# Patient Record
Sex: MALE | Race: WHITE | NOT HISPANIC OR LATINO
[De-identification: names, ages, dates, MRNs, and addresses within clinical notes are randomized per-mention and may not be internally consistent; named-entity substitution may affect disease eponyms.]

---

## 2017-01-03 ENCOUNTER — APPOINTMENT (OUTPATIENT)
Dept: UROLOGY | Facility: CLINIC | Age: 67
End: 2017-01-03

## 2017-01-19 ENCOUNTER — LABORATORY RESULT (OUTPATIENT)
Age: 67
End: 2017-01-19

## 2017-01-19 ENCOUNTER — APPOINTMENT (OUTPATIENT)
Dept: UROLOGY | Facility: CLINIC | Age: 67
End: 2017-01-19

## 2017-02-10 ENCOUNTER — APPOINTMENT (OUTPATIENT)
Dept: UROLOGY | Facility: CLINIC | Age: 67
End: 2017-02-10

## 2017-02-23 ENCOUNTER — APPOINTMENT (OUTPATIENT)
Dept: UROLOGY | Facility: CLINIC | Age: 67
End: 2017-02-23

## 2017-11-07 ENCOUNTER — APPOINTMENT (OUTPATIENT)
Dept: UROLOGY | Facility: CLINIC | Age: 67
End: 2017-11-07

## 2017-11-09 ENCOUNTER — APPOINTMENT (OUTPATIENT)
Dept: UROLOGY | Facility: CLINIC | Age: 67
End: 2017-11-09

## 2017-11-21 ENCOUNTER — OUTPATIENT (OUTPATIENT)
Dept: OUTPATIENT SERVICES | Facility: HOSPITAL | Age: 67
LOS: 1 days | End: 2017-11-21
Payer: MEDICARE

## 2017-11-21 ENCOUNTER — APPOINTMENT (OUTPATIENT)
Dept: UROLOGY | Facility: CLINIC | Age: 67
End: 2017-11-21
Payer: MEDICARE

## 2017-11-21 VITALS
RESPIRATION RATE: 16 BRPM | DIASTOLIC BLOOD PRESSURE: 74 MMHG | TEMPERATURE: 97.6 F | SYSTOLIC BLOOD PRESSURE: 140 MMHG | HEART RATE: 67 BPM

## 2017-11-21 DIAGNOSIS — R35.0 FREQUENCY OF MICTURITION: ICD-10-CM

## 2017-11-21 DIAGNOSIS — Z98.89 OTHER SPECIFIED POSTPROCEDURAL STATES: Chronic | ICD-10-CM

## 2017-11-21 DIAGNOSIS — Z98.49 CATARACT EXTRACTION STATUS, UNSPECIFIED EYE: Chronic | ICD-10-CM

## 2017-11-21 PROCEDURE — 52000 CYSTOURETHROSCOPY: CPT

## 2017-11-27 DIAGNOSIS — Z85.51 PERSONAL HISTORY OF MALIGNANT NEOPLASM OF BLADDER: ICD-10-CM

## 2017-11-27 LAB — CORE LAB FLUID CYTOLOGY: NORMAL

## 2017-12-06 ENCOUNTER — FORM ENCOUNTER (OUTPATIENT)
Age: 67
End: 2017-12-06

## 2017-12-07 ENCOUNTER — APPOINTMENT (OUTPATIENT)
Dept: CT IMAGING | Facility: IMAGING CENTER | Age: 67
End: 2017-12-07
Payer: MEDICARE

## 2017-12-07 ENCOUNTER — OUTPATIENT (OUTPATIENT)
Dept: OUTPATIENT SERVICES | Facility: HOSPITAL | Age: 67
LOS: 1 days | End: 2017-12-07
Payer: MEDICARE

## 2017-12-07 DIAGNOSIS — Z98.49 CATARACT EXTRACTION STATUS, UNSPECIFIED EYE: Chronic | ICD-10-CM

## 2017-12-07 DIAGNOSIS — Z98.89 OTHER SPECIFIED POSTPROCEDURAL STATES: Chronic | ICD-10-CM

## 2017-12-07 DIAGNOSIS — C67.9 MALIGNANT NEOPLASM OF BLADDER, UNSPECIFIED: ICD-10-CM

## 2017-12-07 PROCEDURE — 82565 ASSAY OF CREATININE: CPT

## 2017-12-07 PROCEDURE — 74178 CT ABD&PLV WO CNTR FLWD CNTR: CPT | Mod: 26

## 2017-12-07 PROCEDURE — 74178 CT ABD&PLV WO CNTR FLWD CNTR: CPT

## 2018-01-01 ENCOUNTER — OUTPATIENT (OUTPATIENT)
Dept: OUTPATIENT SERVICES | Facility: HOSPITAL | Age: 68
LOS: 1 days | End: 2018-01-01
Payer: MEDICAID

## 2018-01-01 DIAGNOSIS — Z98.89 OTHER SPECIFIED POSTPROCEDURAL STATES: Chronic | ICD-10-CM

## 2018-01-01 DIAGNOSIS — Z98.49 CATARACT EXTRACTION STATUS, UNSPECIFIED EYE: Chronic | ICD-10-CM

## 2018-01-01 PROCEDURE — G9001: CPT

## 2018-01-10 ENCOUNTER — OUTPATIENT (OUTPATIENT)
Dept: OUTPATIENT SERVICES | Facility: HOSPITAL | Age: 68
LOS: 1 days | End: 2018-01-10
Payer: MEDICARE

## 2018-01-10 VITALS
RESPIRATION RATE: 16 BRPM | WEIGHT: 223.11 LBS | DIASTOLIC BLOOD PRESSURE: 70 MMHG | TEMPERATURE: 98 F | HEIGHT: 65 IN | SYSTOLIC BLOOD PRESSURE: 140 MMHG | HEART RATE: 75 BPM

## 2018-01-10 DIAGNOSIS — I25.10 ATHEROSCLEROTIC HEART DISEASE OF NATIVE CORONARY ARTERY WITHOUT ANGINA PECTORIS: ICD-10-CM

## 2018-01-10 DIAGNOSIS — E11.9 TYPE 2 DIABETES MELLITUS WITHOUT COMPLICATIONS: ICD-10-CM

## 2018-01-10 DIAGNOSIS — Z98.89 OTHER SPECIFIED POSTPROCEDURAL STATES: Chronic | ICD-10-CM

## 2018-01-10 DIAGNOSIS — I10 ESSENTIAL (PRIMARY) HYPERTENSION: ICD-10-CM

## 2018-01-10 DIAGNOSIS — C67.9 MALIGNANT NEOPLASM OF BLADDER, UNSPECIFIED: ICD-10-CM

## 2018-01-10 DIAGNOSIS — Z86.73 PERSONAL HISTORY OF TRANSIENT ISCHEMIC ATTACK (TIA), AND CEREBRAL INFARCTION WITHOUT RESIDUAL DEFICITS: ICD-10-CM

## 2018-01-10 DIAGNOSIS — Z98.49 CATARACT EXTRACTION STATUS, UNSPECIFIED EYE: Chronic | ICD-10-CM

## 2018-01-10 LAB
APPEARANCE UR: SIGNIFICANT CHANGE UP
BILIRUB UR-MCNC: NEGATIVE — SIGNIFICANT CHANGE UP
BLOOD UR QL VISUAL: HIGH
BUN SERPL-MCNC: 20 MG/DL — SIGNIFICANT CHANGE UP (ref 7–23)
CALCIUM SERPL-MCNC: 9.1 MG/DL — SIGNIFICANT CHANGE UP (ref 8.4–10.5)
CHLORIDE SERPL-SCNC: 102 MMOL/L — SIGNIFICANT CHANGE UP (ref 98–107)
CO2 SERPL-SCNC: 26 MMOL/L — SIGNIFICANT CHANGE UP (ref 22–31)
COLOR SPEC: HIGH
CREAT SERPL-MCNC: 0.98 MG/DL — SIGNIFICANT CHANGE UP (ref 0.5–1.3)
GLUCOSE SERPL-MCNC: 99 MG/DL — SIGNIFICANT CHANGE UP (ref 70–99)
GLUCOSE UR-MCNC: SIGNIFICANT CHANGE UP
HBA1C BLD-MCNC: 6.4 % — HIGH (ref 4–5.6)
HCT VFR BLD CALC: 44.6 % — SIGNIFICANT CHANGE UP (ref 39–50)
HGB BLD-MCNC: 15.2 G/DL — SIGNIFICANT CHANGE UP (ref 13–17)
KETONES UR-MCNC: NEGATIVE — SIGNIFICANT CHANGE UP
LEUKOCYTE ESTERASE UR-ACNC: HIGH
MCHC RBC-ENTMCNC: 30.7 PG — SIGNIFICANT CHANGE UP (ref 27–34)
MCHC RBC-ENTMCNC: 34.1 % — SIGNIFICANT CHANGE UP (ref 32–36)
MCV RBC AUTO: 90.1 FL — SIGNIFICANT CHANGE UP (ref 80–100)
NITRITE UR-MCNC: NEGATIVE — SIGNIFICANT CHANGE UP
NRBC # FLD: 0 — SIGNIFICANT CHANGE UP
PH UR: 6.5 — SIGNIFICANT CHANGE UP (ref 4.6–8)
PLATELET # BLD AUTO: 158 K/UL — SIGNIFICANT CHANGE UP (ref 150–400)
PMV BLD: 12.2 FL — SIGNIFICANT CHANGE UP (ref 7–13)
POTASSIUM SERPL-MCNC: 3.3 MMOL/L — LOW (ref 3.5–5.3)
POTASSIUM SERPL-SCNC: 3.3 MMOL/L — LOW (ref 3.5–5.3)
PROT UR-MCNC: 100 MG/DL — SIGNIFICANT CHANGE UP
RBC # BLD: 4.95 M/UL — SIGNIFICANT CHANGE UP (ref 4.2–5.8)
RBC # FLD: 14.3 % — SIGNIFICANT CHANGE UP (ref 10.3–14.5)
RBC CASTS # UR COMP ASSIST: >50 — HIGH (ref 0–?)
SODIUM SERPL-SCNC: 144 MMOL/L — SIGNIFICANT CHANGE UP (ref 135–145)
SP GR SPEC: 1.02 — SIGNIFICANT CHANGE UP (ref 1–1.04)
UROBILINOGEN FLD QL: NORMAL MG/DL — SIGNIFICANT CHANGE UP
WBC # BLD: 5.85 K/UL — SIGNIFICANT CHANGE UP (ref 3.8–10.5)
WBC # FLD AUTO: 5.85 K/UL — SIGNIFICANT CHANGE UP (ref 3.8–10.5)
WBC UR QL: >50 — HIGH (ref 0–?)

## 2018-01-10 PROCEDURE — 93010 ELECTROCARDIOGRAM REPORT: CPT

## 2018-01-10 RX ORDER — SODIUM CHLORIDE 9 MG/ML
1000 INJECTION, SOLUTION INTRAVENOUS
Qty: 0 | Refills: 0 | Status: DISCONTINUED | OUTPATIENT
Start: 2018-01-17 | End: 2018-01-17

## 2018-01-10 NOTE — H&P PST ADULT - GENITOURINARY COMMENTS
reports not able to urinate today , unable to collect urine cx, pt stated that it was done few weeks ago by PCP refer to hpi

## 2018-01-10 NOTE — H&P PST ADULT - PMH
BPH (benign prostatic hyperplasia)    Coronary artery disease    History of MI (myocardial infarction)  on Plavix  History of stroke  2010, no residual  Hyperlipidemia    Hypertension    Malignant neoplasm of bladder  multiple bladder tumors  Type 2 diabetes mellitus    UTI (urinary tract infection)  treated in May 2016 BPH (benign prostatic hyperplasia)    Coronary artery disease    History of MI (myocardial infarction)  on Plavix  History of stroke  2010, no residual  Hyperlipidemia    Hypertension    Malignant neoplasm of bladder  multiple bladder tumors  Obesity    Type 2 diabetes mellitus    UTI (urinary tract infection)  treated in May 2016

## 2018-01-10 NOTE — H&P PST ADULT - NSANTHOSAYNRD_GEN_A_CORE
No. SORAIDA screening performed.  STOP BANG Legend: 0-2 = LOW Risk; 3-4 = INTERMEDIATE Risk; 5-8 = HIGH Risk

## 2018-01-10 NOTE — H&P PST ADULT - PROBLEM SELECTOR PLAN 4
gross hematuria.   per pt plavix and low dose aspirin d/c 2 months ago  pt is poor historian.  Requested medical eval, appointment scheduled for 1/11/18 @ 12noon  Pending copy of report & comparison EKG and most recent cardiology studies

## 2018-01-10 NOTE — H&P PST ADULT - ENDOCRINE COMMENTS
Denies diabetic compilations type II DM type II DM fasting home FS monitoring done, pt states  "I don't remember any numbers"

## 2018-01-10 NOTE — H&P PST ADULT - PRIMARY CARE PROVIDER
Dr. Teran [phone], fax [phone] - spoke with Chelsea to forward recent labs Dr. Teran [phone], fax [phone]

## 2018-01-10 NOTE — H&P PST ADULT - NEGATIVE CARDIOVASCULAR SYMPTOMS
no chest pain/no paroxysmal nocturnal dyspnea/no orthopnea/no dyspnea on exertion/no claudication/no palpitations/no peripheral edema

## 2018-01-10 NOTE — H&P PST ADULT - PROBLEM SELECTOR PLAN 3
pt instructed to hold evening dose metformin on 1/16/18 pt instructed to hold evening dose metformin on 1/16/18  fs on admit

## 2018-01-10 NOTE — H&P PST ADULT - LYMPHATIC
posterior cervical R/posterior cervical L/anterior cervical L/supraclavicular L/anterior cervical R/supraclavicular R

## 2018-01-10 NOTE — H&P PST ADULT - HISTORY OF PRESENT ILLNESS
65 yr old Swedish speaking male with HTN, DM2, HLD, CAD, h/o MI, CVA ( 2010) on Plavix only, BPH, malignant neoplasm of the urinary bladder s/p TURBT 5/24/16 at [facility], presents to PST for scheduled repeat TURBT on 8/3/16. Communicated with patient in native language. Patient is poor historian, easily irritated and impatient. Refused to do lab work today, reports recent labs by  PCP. Completed cardiac evaluation by Dr. Graham prior initial TURBT in May, I will obtain records. 67 yr old Colombian speaking male with HTN, DM2, HLD, CAD, h/o MI, CVA ( 2010) on Plavix only, BPH, malignant neoplasm of the urinary bladder s/p TURBT 5/24/16 at [facility], presents to PST for scheduled cystoscopy TURBT on 1/17/2018.  Patient is poor historian, easily irritated and impatient. Refused to do lab work today, reports recent labs by  PCP. Completed cardiac evaluation by Dr. Graham prior initial TURBT in May, I will obtain records. 67 yr old Barbadian speaking male with HTN, DM2, HLD, CAD, h/o MI, CVA ( 2010) on Plavix only, BPH, malignant neoplasm of the urinary bladder s/p TURBT 5/24/16 at [facility], presents to PST for scheduled cystoscopy TURBT on 1/17/2018.  Patient is poor historian, easily irritated and impatient. 67 yr old Uruguayan speaking male with HTN, DM2, HLD, CAD, h/o MI, CVA ( 2010) on Plavix & low dose aspirin - per pt d/c 2 months ago due to gross hematuria. BPH, malignant neoplasm of the urinary bladder s/p TURBT 5/24/16 at [facility], presents to PST for scheduled cystoscopy TURBT on 1/17/2018.  Patient is poor historian, easily irritated and impatient.

## 2018-01-10 NOTE — H&P PST ADULT - PSH
History of bladder surgery  TURBT 5/24/16  S/P cataract surgery  bilateral  S/P inguinal hernia repair  bilateral

## 2018-01-10 NOTE — H&P PST ADULT - NEGATIVE GASTROINTESTINAL SYMPTOMS
no change in bowel habits/no abdominal pain no nausea/no vomiting/no constipation/no change in bowel habits/no diarrhea

## 2018-01-10 NOTE — H&P PST ADULT - ACTIVITY
able to walk a block or two, climb 1 flight of stairs, house work, shopping, can take care of self adls, house chores

## 2018-01-10 NOTE — H&P PST ADULT - PROBLEM SELECTOR PLAN 1
scheduled for cystoscopy, TURB tumor on 1/17/018  preop instructions gi prophylaxis given  pt verbalized understanding of all PSt instructions

## 2018-01-12 LAB
BACTERIA UR CULT: SIGNIFICANT CHANGE UP
SPECIMEN SOURCE: SIGNIFICANT CHANGE UP

## 2018-01-16 NOTE — ASU PATIENT PROFILE, ADULT - PMH
BPH (benign prostatic hyperplasia)    Coronary artery disease    History of MI (myocardial infarction)  on Plavix  History of stroke  2010, no residual  Hyperlipidemia    Hypertension    Malignant neoplasm of bladder  multiple bladder tumors  Obesity    Type 2 diabetes mellitus    UTI (urinary tract infection)  treated in May 2016

## 2018-01-17 ENCOUNTER — RESULT REVIEW (OUTPATIENT)
Age: 68
End: 2018-01-17

## 2018-01-17 ENCOUNTER — APPOINTMENT (OUTPATIENT)
Dept: UROLOGY | Facility: HOSPITAL | Age: 68
End: 2018-01-17

## 2018-01-17 ENCOUNTER — TRANSCRIPTION ENCOUNTER (OUTPATIENT)
Age: 68
End: 2018-01-17

## 2018-01-17 ENCOUNTER — INPATIENT (INPATIENT)
Facility: HOSPITAL | Age: 68
LOS: 1 days | Discharge: HOME | End: 2018-01-19
Attending: UROLOGY | Admitting: UROLOGY
Payer: MEDICARE

## 2018-01-17 VITALS
OXYGEN SATURATION: 96 % | DIASTOLIC BLOOD PRESSURE: 68 MMHG | HEART RATE: 77 BPM | SYSTOLIC BLOOD PRESSURE: 144 MMHG | HEIGHT: 66 IN | TEMPERATURE: 98 F | RESPIRATION RATE: 18 BRPM | WEIGHT: 220.02 LBS

## 2018-01-17 DIAGNOSIS — C67.9 MALIGNANT NEOPLASM OF BLADDER, UNSPECIFIED: ICD-10-CM

## 2018-01-17 DIAGNOSIS — Z98.49 CATARACT EXTRACTION STATUS, UNSPECIFIED EYE: Chronic | ICD-10-CM

## 2018-01-17 DIAGNOSIS — Z98.89 OTHER SPECIFIED POSTPROCEDURAL STATES: Chronic | ICD-10-CM

## 2018-01-17 LAB
GLUCOSE BLDC GLUCOMTR-MCNC: 137 MG/DL — HIGH (ref 70–99)
GLUCOSE BLDC GLUCOMTR-MCNC: 141 MG/DL — HIGH (ref 70–99)
POTASSIUM BLDV-SCNC: 3.9 MMOL/L — SIGNIFICANT CHANGE UP (ref 3.4–4.5)

## 2018-01-17 PROCEDURE — 52240 CYSTOSCOPY AND TREATMENT: CPT

## 2018-01-17 PROCEDURE — 88307 TISSUE EXAM BY PATHOLOGIST: CPT | Mod: 26

## 2018-01-17 RX ORDER — FENTANYL CITRATE 50 UG/ML
25 INJECTION INTRAVENOUS
Qty: 0 | Refills: 0 | Status: DISCONTINUED | OUTPATIENT
Start: 2018-01-17 | End: 2018-01-17

## 2018-01-17 RX ORDER — DEXTROSE 50 % IN WATER 50 %
12.5 SYRINGE (ML) INTRAVENOUS ONCE
Qty: 0 | Refills: 0 | Status: DISCONTINUED | OUTPATIENT
Start: 2018-01-17 | End: 2018-01-19

## 2018-01-17 RX ORDER — SODIUM CHLORIDE 9 MG/ML
1000 INJECTION, SOLUTION INTRAVENOUS
Qty: 0 | Refills: 0 | Status: DISCONTINUED | OUTPATIENT
Start: 2018-01-17 | End: 2018-01-19

## 2018-01-17 RX ORDER — CEPHALEXIN 500 MG
1 CAPSULE ORAL
Qty: 9 | Refills: 0 | OUTPATIENT
Start: 2018-01-17 | End: 2018-01-19

## 2018-01-17 RX ORDER — ONDANSETRON 8 MG/1
4 TABLET, FILM COATED ORAL ONCE
Qty: 0 | Refills: 0 | Status: DISCONTINUED | OUTPATIENT
Start: 2018-01-17 | End: 2018-01-17

## 2018-01-17 RX ORDER — DEXTROSE 50 % IN WATER 50 %
25 SYRINGE (ML) INTRAVENOUS ONCE
Qty: 0 | Refills: 0 | Status: DISCONTINUED | OUTPATIENT
Start: 2018-01-17 | End: 2018-01-19

## 2018-01-17 RX ORDER — CEFAZOLIN SODIUM 1 G
2000 VIAL (EA) INJECTION EVERY 8 HOURS
Qty: 0 | Refills: 0 | Status: COMPLETED | OUTPATIENT
Start: 2018-01-17 | End: 2018-01-18

## 2018-01-17 RX ORDER — SODIUM CHLORIDE 9 MG/ML
1000 INJECTION, SOLUTION INTRAVENOUS
Qty: 0 | Refills: 0 | Status: DISCONTINUED | OUTPATIENT
Start: 2018-01-17 | End: 2018-01-18

## 2018-01-17 RX ORDER — INSULIN LISPRO 100/ML
VIAL (ML) SUBCUTANEOUS AT BEDTIME
Qty: 0 | Refills: 0 | Status: DISCONTINUED | OUTPATIENT
Start: 2018-01-17 | End: 2018-01-19

## 2018-01-17 RX ORDER — MEMANTINE HYDROCHLORIDE 10 MG/1
10 TABLET ORAL
Qty: 0 | Refills: 0 | Status: DISCONTINUED | OUTPATIENT
Start: 2018-01-17 | End: 2018-01-19

## 2018-01-17 RX ORDER — TAMSULOSIN HYDROCHLORIDE 0.4 MG/1
0.4 CAPSULE ORAL
Qty: 0 | Refills: 0 | Status: DISCONTINUED | OUTPATIENT
Start: 2018-01-17 | End: 2018-01-19

## 2018-01-17 RX ORDER — ACETAMINOPHEN 500 MG
650 TABLET ORAL EVERY 6 HOURS
Qty: 0 | Refills: 0 | Status: DISCONTINUED | OUTPATIENT
Start: 2018-01-17 | End: 2018-01-19

## 2018-01-17 RX ORDER — LIDOCAINE 4 G/100G
1 CREAM TOPICAL
Qty: 0 | Refills: 0 | COMMUNITY
Start: 2018-01-17

## 2018-01-17 RX ORDER — DEXTROSE 50 % IN WATER 50 %
1 SYRINGE (ML) INTRAVENOUS ONCE
Qty: 0 | Refills: 0 | Status: DISCONTINUED | OUTPATIENT
Start: 2018-01-17 | End: 2018-01-19

## 2018-01-17 RX ORDER — AMLODIPINE BESYLATE 2.5 MG/1
10 TABLET ORAL DAILY
Qty: 0 | Refills: 0 | Status: DISCONTINUED | OUTPATIENT
Start: 2018-01-17 | End: 2018-01-19

## 2018-01-17 RX ORDER — METOPROLOL TARTRATE 50 MG
25 TABLET ORAL
Qty: 0 | Refills: 0 | Status: DISCONTINUED | OUTPATIENT
Start: 2018-01-17 | End: 2018-01-19

## 2018-01-17 RX ORDER — INSULIN LISPRO 100/ML
VIAL (ML) SUBCUTANEOUS
Qty: 0 | Refills: 0 | Status: DISCONTINUED | OUTPATIENT
Start: 2018-01-17 | End: 2018-01-19

## 2018-01-17 RX ORDER — ATORVASTATIN CALCIUM 80 MG/1
20 TABLET, FILM COATED ORAL ONCE
Qty: 0 | Refills: 0 | Status: DISCONTINUED | OUTPATIENT
Start: 2018-01-17 | End: 2018-01-17

## 2018-01-17 RX ORDER — GLUCAGON INJECTION, SOLUTION 0.5 MG/.1ML
1 INJECTION, SOLUTION SUBCUTANEOUS ONCE
Qty: 0 | Refills: 0 | Status: DISCONTINUED | OUTPATIENT
Start: 2018-01-17 | End: 2018-01-19

## 2018-01-17 RX ORDER — FENTANYL CITRATE 50 UG/ML
50 INJECTION INTRAVENOUS
Qty: 0 | Refills: 0 | Status: DISCONTINUED | OUTPATIENT
Start: 2018-01-17 | End: 2018-01-17

## 2018-01-17 RX ORDER — SENNA PLUS 8.6 MG/1
1 TABLET ORAL AT BEDTIME
Qty: 0 | Refills: 0 | Status: DISCONTINUED | OUTPATIENT
Start: 2018-01-17 | End: 2018-01-19

## 2018-01-17 RX ORDER — HYDROCHLOROTHIAZIDE 25 MG
25 TABLET ORAL ONCE
Qty: 0 | Refills: 0 | Status: COMPLETED | OUTPATIENT
Start: 2018-01-17 | End: 2018-01-17

## 2018-01-17 RX ORDER — LIDOCAINE 4 G/100G
1 CREAM TOPICAL
Qty: 0 | Refills: 0 | Status: DISCONTINUED | OUTPATIENT
Start: 2018-01-17 | End: 2018-01-19

## 2018-01-17 RX ORDER — ATORVASTATIN CALCIUM 80 MG/1
80 TABLET, FILM COATED ORAL AT BEDTIME
Qty: 0 | Refills: 0 | Status: DISCONTINUED | OUTPATIENT
Start: 2018-01-17 | End: 2018-01-19

## 2018-01-17 RX ORDER — HEPARIN SODIUM 5000 [USP'U]/ML
5000 INJECTION INTRAVENOUS; SUBCUTANEOUS EVERY 8 HOURS
Qty: 0 | Refills: 0 | Status: DISCONTINUED | OUTPATIENT
Start: 2018-01-17 | End: 2018-01-19

## 2018-01-17 RX ORDER — ASPIRIN/CALCIUM CARB/MAGNESIUM 324 MG
325 TABLET ORAL ONCE
Qty: 0 | Refills: 0 | Status: COMPLETED | OUTPATIENT
Start: 2018-01-17 | End: 2018-01-17

## 2018-01-17 RX ORDER — CLOPIDOGREL BISULFATE 75 MG/1
1 TABLET, FILM COATED ORAL
Qty: 0 | Refills: 0 | COMMUNITY

## 2018-01-17 RX ORDER — DOCUSATE SODIUM 100 MG
100 CAPSULE ORAL THREE TIMES A DAY
Qty: 0 | Refills: 0 | Status: DISCONTINUED | OUTPATIENT
Start: 2018-01-17 | End: 2018-01-19

## 2018-01-17 RX ORDER — LOSARTAN POTASSIUM 100 MG/1
100 TABLET, FILM COATED ORAL DAILY
Qty: 0 | Refills: 0 | Status: DISCONTINUED | OUTPATIENT
Start: 2018-01-17 | End: 2018-01-19

## 2018-01-17 RX ADMIN — Medication 325 MILLIGRAM(S): at 09:41

## 2018-01-17 RX ADMIN — MEMANTINE HYDROCHLORIDE 10 MILLIGRAM(S): 10 TABLET ORAL at 21:49

## 2018-01-17 RX ADMIN — FENTANYL CITRATE 50 MICROGRAM(S): 50 INJECTION INTRAVENOUS at 13:25

## 2018-01-17 RX ADMIN — LIDOCAINE 1 APPLICATION(S): 4 CREAM TOPICAL at 13:45

## 2018-01-17 RX ADMIN — FENTANYL CITRATE 25 MICROGRAM(S): 50 INJECTION INTRAVENOUS at 13:16

## 2018-01-17 RX ADMIN — Medication 25 MILLIGRAM(S): at 21:49

## 2018-01-17 RX ADMIN — FENTANYL CITRATE 25 MICROGRAM(S): 50 INJECTION INTRAVENOUS at 12:45

## 2018-01-17 RX ADMIN — Medication 100 MILLIGRAM(S): at 21:49

## 2018-01-17 RX ADMIN — FENTANYL CITRATE 25 MICROGRAM(S): 50 INJECTION INTRAVENOUS at 12:26

## 2018-01-17 RX ADMIN — SODIUM CHLORIDE 30 MILLILITER(S): 9 INJECTION, SOLUTION INTRAVENOUS at 09:41

## 2018-01-17 RX ADMIN — FENTANYL CITRATE 50 MICROGRAM(S): 50 INJECTION INTRAVENOUS at 13:47

## 2018-01-17 RX ADMIN — TAMSULOSIN HYDROCHLORIDE 0.4 MILLIGRAM(S): 0.4 CAPSULE ORAL at 19:26

## 2018-01-17 RX ADMIN — FENTANYL CITRATE 25 MICROGRAM(S): 50 INJECTION INTRAVENOUS at 12:41

## 2018-01-17 RX ADMIN — SENNA PLUS 1 TABLET(S): 8.6 TABLET ORAL at 21:49

## 2018-01-17 NOTE — PROGRESS NOTE ADULT - PROBLEM SELECTOR PLAN 1
Strict I&O's / CBI / irrigate PRN  Analgesia Antiemetics  DVT prophylaxis  Incentive spirometry  Clears to Reg DM / OOB  AM labs  Wean CBI  in AM   D/C planning in AM

## 2018-01-17 NOTE — ASU DISCHARGE PLAN (ADULT/PEDIATRIC). - MEDICATION SUMMARY - MEDICATIONS TO STOP TAKING
I will STOP taking the medications listed below when I get home from the hospital:    Plavix 75 mg oral tablet  -- 1 tab(s) by mouth once a day, am, last dose 2 months ago

## 2018-01-17 NOTE — ASU DISCHARGE PLAN (ADULT/PEDIATRIC). - SPECIAL INSTRUCTIONS
You may take over the counter tylenol and/or ibuprofen as directed for pain.     Drink plenty of fluids. You may notice blood in your urine for several days, which is normal.    Take 3 days of antibiotics to prevent urinary tract infection    Dr. Yun would like to see you next Thursday for catheter removal. Please call the office to schedule/confirm an appointment.    [facility]  [address] [place] [address] [phone]

## 2018-01-17 NOTE — PROGRESS NOTE ADULT - SUBJECTIVE AND OBJECTIVE BOX
Note    Post op Check    s/p : TURBT    Pt seen / examined without complaints pain controlled on CBI    Vital Signs Last 24 Hrs  T(C): 36.4 (17 Jan 2018 15:00), Max: 36.6 (17 Jan 2018 12:10)  T(F): 97.5 (17 Jan 2018 15:00), Max: 97.9 (17 Jan 2018 12:10)  HR: 70 (17 Jan 2018 17:00) (55 - 77)  BP: 141/53 (17 Jan 2018 17:00) (129/61 - 164/83)  BP(mean): --  RR: 16 (17 Jan 2018 17:00) (10 - 22)  SpO2: 97% (17 Jan 2018 17:00) (93% - 100%)    I&O's Summary    17 Jan 2018 07:01  -  17 Jan 2018 18:01  --------------------------------------------------------  IN: 690 mL / OUT: 0 mL / NET: 690 mL    CBI light punch    PHYSICAL EXAM:       Constitutional: awake alert NAD    Respiratory: no resp distress    Cardiovascular: RRR    Gastrointestinal: soft    Genitourinary: varela in place CBI in progress light punch    Extremities: AROM x 4

## 2018-01-17 NOTE — ASU DISCHARGE PLAN (ADULT/PEDIATRIC). - MEDICATION SUMMARY - MEDICATIONS TO TAKE
I will START or STAY ON the medications listed below when I get home from the hospital:    vitamin D  -- 39309 unit(s) by mouth once a week  -- Indication: For home med    aspirin 81 mg oral tablet  -- 1 tab(s) by mouth once a dayam - last dose 2 months ago  -- Indication: For home med    cloNIDine 0.1 mg oral tablet  -- orally once a day,am  -- Indication: For home med    Flomax 0.4 mg oral capsule  -- orally 2 times a day  -- Indication: For home med    metFORMIN 500 mg oral tablet  -- orally once a day  -- Indication: For home med    Crestor 20 mg oral tablet  -- 1 tab(s) by mouth once a day (at bedtime)  -- Indication: For home med    Tribenzor 40 mg-10 mg-25 mg oral tablet  -- 1 tab(s) by mouth once a day  -- Indication: For home med    metoprolol tartrate 25 mg oral tablet  -- 1 tab(s) by mouth 2 times a day  -- Indication: For home med    Keflex 500 mg oral capsule  -- 1 cap(s) by mouth every 8 hours   -- Finish all this medication unless otherwise directed by prescriber.    -- Indication: For UTI prevention    lidocaine 2% topical gel with applicator  -- 1 application on skin 5 times a day, As needed, catheter pain  -- Indication: For catheter/penile tip pain    Namenda 10 mg oral tablet  -- 1 tab(s) by mouth 2 times a day  -- Indication: For home med

## 2018-01-17 NOTE — ASU DISCHARGE PLAN (ADULT/PEDIATRIC). - NOTIFY
Bleeding that does not stop/Persistent Nausea and Vomiting/Unable to Urinate/Fever greater than 101/Pain not relieved by Medications

## 2018-01-17 NOTE — BRIEF OPERATIVE NOTE - PROCEDURE
<<-----Click on this checkbox to enter Procedure JEMAL, using monopolar cautery  01/17/2018    Active  JEFF

## 2018-01-18 ENCOUNTER — TRANSCRIPTION ENCOUNTER (OUTPATIENT)
Age: 68
End: 2018-01-18

## 2018-01-18 DIAGNOSIS — I25.10 ATHEROSCLEROTIC HEART DISEASE OF NATIVE CORONARY ARTERY WITHOUT ANGINA PECTORIS: ICD-10-CM

## 2018-01-18 DIAGNOSIS — I10 ESSENTIAL (PRIMARY) HYPERTENSION: ICD-10-CM

## 2018-01-18 DIAGNOSIS — D62 ACUTE POSTHEMORRHAGIC ANEMIA: ICD-10-CM

## 2018-01-18 DIAGNOSIS — Z86.73 PERSONAL HISTORY OF TRANSIENT ISCHEMIC ATTACK (TIA), AND CEREBRAL INFARCTION WITHOUT RESIDUAL DEFICITS: ICD-10-CM

## 2018-01-18 DIAGNOSIS — E78.5 HYPERLIPIDEMIA, UNSPECIFIED: ICD-10-CM

## 2018-01-18 DIAGNOSIS — N40.0 BENIGN PROSTATIC HYPERPLASIA WITHOUT LOWER URINARY TRACT SYMPTOMS: ICD-10-CM

## 2018-01-18 DIAGNOSIS — E11.9 TYPE 2 DIABETES MELLITUS WITHOUT COMPLICATIONS: ICD-10-CM

## 2018-01-18 DIAGNOSIS — Z29.9 ENCOUNTER FOR PROPHYLACTIC MEASURES, UNSPECIFIED: ICD-10-CM

## 2018-01-18 LAB
BUN SERPL-MCNC: 15 MG/DL — SIGNIFICANT CHANGE UP (ref 7–23)
CALCIUM SERPL-MCNC: 8 MG/DL — LOW (ref 8.4–10.5)
CHLORIDE SERPL-SCNC: 102 MMOL/L — SIGNIFICANT CHANGE UP (ref 98–107)
CO2 SERPL-SCNC: 26 MMOL/L — SIGNIFICANT CHANGE UP (ref 22–31)
CREAT SERPL-MCNC: 1.01 MG/DL — SIGNIFICANT CHANGE UP (ref 0.5–1.3)
GLUCOSE BLDC GLUCOMTR-MCNC: 122 MG/DL — HIGH (ref 70–99)
GLUCOSE BLDC GLUCOMTR-MCNC: 130 MG/DL — HIGH (ref 70–99)
GLUCOSE BLDC GLUCOMTR-MCNC: 152 MG/DL — HIGH (ref 70–99)
GLUCOSE BLDC GLUCOMTR-MCNC: 184 MG/DL — HIGH (ref 70–99)
GLUCOSE SERPL-MCNC: 167 MG/DL — HIGH (ref 70–99)
HCT VFR BLD CALC: 35.9 % — LOW (ref 39–50)
HGB BLD-MCNC: 11.5 G/DL — LOW (ref 13–17)
MCHC RBC-ENTMCNC: 29.2 PG — SIGNIFICANT CHANGE UP (ref 27–34)
MCHC RBC-ENTMCNC: 32 % — SIGNIFICANT CHANGE UP (ref 32–36)
MCV RBC AUTO: 91.1 FL — SIGNIFICANT CHANGE UP (ref 80–100)
NRBC # FLD: 0 — SIGNIFICANT CHANGE UP
PLATELET # BLD AUTO: 142 K/UL — LOW (ref 150–400)
PMV BLD: 12.2 FL — SIGNIFICANT CHANGE UP (ref 7–13)
POTASSIUM SERPL-MCNC: 4.1 MMOL/L — SIGNIFICANT CHANGE UP (ref 3.5–5.3)
POTASSIUM SERPL-SCNC: 4.1 MMOL/L — SIGNIFICANT CHANGE UP (ref 3.5–5.3)
RBC # BLD: 3.94 M/UL — LOW (ref 4.2–5.8)
RBC # FLD: 14 % — SIGNIFICANT CHANGE UP (ref 10.3–14.5)
SODIUM SERPL-SCNC: 139 MMOL/L — SIGNIFICANT CHANGE UP (ref 135–145)
WBC # BLD: 5.92 K/UL — SIGNIFICANT CHANGE UP (ref 3.8–10.5)
WBC # FLD AUTO: 5.92 K/UL — SIGNIFICANT CHANGE UP (ref 3.8–10.5)

## 2018-01-18 PROCEDURE — 99223 1ST HOSP IP/OBS HIGH 75: CPT

## 2018-01-18 RX ORDER — OXYBUTYNIN CHLORIDE 5 MG
5 TABLET ORAL EVERY 8 HOURS
Qty: 0 | Refills: 0 | Status: DISCONTINUED | OUTPATIENT
Start: 2018-01-18 | End: 2018-01-19

## 2018-01-18 RX ORDER — SODIUM CHLORIDE 9 MG/ML
1000 INJECTION, SOLUTION INTRAVENOUS
Qty: 0 | Refills: 0 | Status: DISCONTINUED | OUTPATIENT
Start: 2018-01-18 | End: 2018-01-18

## 2018-01-18 RX ADMIN — Medication 100 MILLIGRAM(S): at 13:56

## 2018-01-18 RX ADMIN — SENNA PLUS 1 TABLET(S): 8.6 TABLET ORAL at 22:25

## 2018-01-18 RX ADMIN — MEMANTINE HYDROCHLORIDE 10 MILLIGRAM(S): 10 TABLET ORAL at 05:45

## 2018-01-18 RX ADMIN — Medication 2: at 17:43

## 2018-01-18 RX ADMIN — TAMSULOSIN HYDROCHLORIDE 0.4 MILLIGRAM(S): 0.4 CAPSULE ORAL at 17:44

## 2018-01-18 RX ADMIN — Medication 100 MILLIGRAM(S): at 05:45

## 2018-01-18 RX ADMIN — SODIUM CHLORIDE 75 MILLILITER(S): 9 INJECTION, SOLUTION INTRAVENOUS at 13:55

## 2018-01-18 RX ADMIN — Medication 100 MILLIGRAM(S): at 22:25

## 2018-01-18 RX ADMIN — TAMSULOSIN HYDROCHLORIDE 0.4 MILLIGRAM(S): 0.4 CAPSULE ORAL at 05:45

## 2018-01-18 RX ADMIN — Medication 25 MILLIGRAM(S): at 17:44

## 2018-01-18 RX ADMIN — HEPARIN SODIUM 5000 UNIT(S): 5000 INJECTION INTRAVENOUS; SUBCUTANEOUS at 13:56

## 2018-01-18 RX ADMIN — Medication 5 MILLIGRAM(S): at 22:25

## 2018-01-18 RX ADMIN — Medication 2: at 08:57

## 2018-01-18 RX ADMIN — ATORVASTATIN CALCIUM 80 MILLIGRAM(S): 80 TABLET, FILM COATED ORAL at 02:05

## 2018-01-18 RX ADMIN — HEPARIN SODIUM 5000 UNIT(S): 5000 INJECTION INTRAVENOUS; SUBCUTANEOUS at 22:25

## 2018-01-18 RX ADMIN — ATORVASTATIN CALCIUM 80 MILLIGRAM(S): 80 TABLET, FILM COATED ORAL at 22:25

## 2018-01-18 RX ADMIN — Medication 5 MILLIGRAM(S): at 13:56

## 2018-01-18 RX ADMIN — MEMANTINE HYDROCHLORIDE 10 MILLIGRAM(S): 10 TABLET ORAL at 17:44

## 2018-01-18 NOTE — CONSULT NOTE ADULT - PROBLEM SELECTOR RECOMMENDATION 9
-s/p cysto/TURBT of large bladder tumor on 1/17  -postop management per , IVF, awaiting return of bowel function, pain control, bowel regimen, incentive spirometry and early mobilization  -d/c plan per primary team  -h/o UTI, on cefazolin prophylaxis  -wean CBI for gross hematuria per primary team heparin sc for DVT prophylaxis  pt with hypercoagulable state due to malignancy

## 2018-01-18 NOTE — DISCHARGE NOTE ADULT - CARE PLAN
Principal Discharge DX:	Malignant neoplasm of bladder  Goal:	Surgical Recovery  Assessment and plan of treatment:	s/p TURBT  Resolution of Hematuria

## 2018-01-18 NOTE — DISCHARGE NOTE ADULT - HOSPITAL COURSE
Admitted for Transurethral Resection of Bladder Tumor on 1/17/18.  Pt was placed on CBI post operatively and slowly weaned until POD#1 where is was discontinued.  Pt's urine color was acceptable, was tolerating diet and pain was controlled.  Pt will be d/c'ed with varela and f/u in 1 week for a TOV in Dr. Yun's office

## 2018-01-18 NOTE — DISCHARGE NOTE ADULT - INSTRUCTIONS
Slowly resume previous diet.  Initially avoid fatty, greasy foods and large meals.  Maintain Diabetic Diet  Increase fluid intake. Slowly resume previous diet.  Initially avoid fatty, greasy foods and large meals.  Maintain Diabetic Diet  Increase fluid intake. Kuo care as instructed; get iron tabs, folic acid and stool softener at your pharmacy; take for 2 weeks to help blood count; make appt to see your doctor Tuesday drink plenty of fluids.call md for any fever above 100.2,bloody urine, and worsening pain

## 2018-01-18 NOTE — CONSULT NOTE ADULT - ASSESSMENT
67 yr old Palestinian speaking male with h/o HTN, DM-2, dyslipidemia, CAD s/p MI (no stent), CVA ( 2010) without residual deficit on ASA (plavix discontinued 2 months ago due to gross hematuria), BPH, BPH, bladder cancer s/p TURBT 5/24/16 at [facility],s/p cystoscopy and TURBT of large bladder tumor on 1/17/2018, postop gross hematuria on CBI

## 2018-01-18 NOTE — PROGRESS NOTE ADULT - PROBLEM SELECTOR PLAN 1
Continue CBI on full  Manual irrigation prn  Continue Ancef  AM labs reviewed  NPO for now, will d/w Dr. Yun further plan  DVT prophy, IS  OOB to chair

## 2018-01-18 NOTE — DISCHARGE NOTE ADULT - MEDICATION SUMMARY - MEDICATIONS TO STOP TAKING
I will STOP taking the medications listed below when I get home from the hospital:    aspirin 81 mg oral tablet  -- 1 tab(s) by mouth once a dayam - last dose 2 months ago    Plavix 75 mg oral tablet  -- 1 tab(s) by mouth once a day, am, last dose 2 months ago

## 2018-01-18 NOTE — CONSULT NOTE ADULT - PROBLEM SELECTOR RECOMMENDATION 2
-c/w cardiac meds (lopressor 25 mg bid, losartan 100 mg qd, statin)  -resume ASA 81 mg when cleared by  -h/o CAD and MI, no stent  -c/w cardiac meds (lopressor 25 mg bid, losartan 100 mg qd, statin)  -resume ASA 81 mg when cleared by   Preop cardiac workup  EKG (1/10/18): NSR, first degree AV block, RBBB, HR 75, TWI III and aVF  Echo (6/20/17): normal LVEF 55%, mild diastolic dysfxn, mild aortic sclerosis  Nuclear stress test (4/21/16): no myocardial ischemia

## 2018-01-18 NOTE — DISCHARGE NOTE ADULT - PATIENT PORTAL LINK FT
“You can access the [facility] Patient Portal, offered by [facility], by registering with the following website: [email or web address]”

## 2018-01-18 NOTE — PROGRESS NOTE ADULT - SUBJECTIVE AND OBJECTIVE BOX
Overnight events:  None, remained on CBI    Subjective:  Pt offers no complaints, no N/V, abdominal pain, not OOB yet    Objective:    Vital signs  T(C): , Max: 37.1 (01-18-18 @ 02:14)  HR: 67 (01-18-18 @ 05:37)  BP: 107/57 (01-18-18 @ 05:37)  SpO2: 99% (01-18-18 @ 05:37)  Wt(kg): --    Output   CBI: bloody    Gen: NAD  Abd: soft, nontender, no suprapubic tenderness or fullness  : Kuo secured, flushed, no clots    Labs                        11.5   5.92  )-----------( 142      ( 18 Jan 2018 06:30 )             35.9     18 Jan 2018 06:30    139    |  102    |  15     ----------------------------<  167    4.1     |  26     |  1.01     Ca    8.0        18 Jan 2018 06:30

## 2018-01-18 NOTE — DISCHARGE NOTE ADULT - CONDITIONS AT DISCHARGE
alert and awake. abdomen soft. Kuo with light blood tinged output. no clots noted .fo césar connected to leg bag. ambulation tolerated well.

## 2018-01-18 NOTE — DISCHARGE NOTE ADULT - CARE PROVIDERS DIRECT ADDRESSES
[email or web address]

## 2018-01-18 NOTE — CONSULT NOTE ADULT - SUBJECTIVE AND OBJECTIVE BOX
HPI:  67 yr old Mexican speaking male with h/o HTN, DM-2, dyslipidemia, CAD s/p MI (no stent), CVA ( 2010) without residual deficit on ASA (plavix discontinued 2 months ago due to gross hematuria), BPH, BPH, bladder cancer s/p TURBT 5/24/16 at [facility], admitted for cystoscopy and TURBT of large bladder tumor on 1/17/2018. Patient seen on POD1, denies h/o cardiac stent/chest pain/sob, c/o gross hematuria postop and has concerns about going home today with varela.     PAST MEDICAL & SURGICAL HISTORY:  Obesity  UTI (urinary tract infection): treated in May 2016  Hyperlipidemia  History of stroke: 2010, no residual  History of MI (myocardial infarction): on Plavix  Coronary artery disease  Malignant neoplasm of bladder: multiple bladder tumors  BPH (benign prostatic hyperplasia)  Hypertension  Type 2 diabetes mellitus  History of bladder surgery: TURBT 5/24/16 &amp; 8/2016  S/P cataract surgery: bilateral  S/P inguinal hernia repair: bilateral      Review of Systems:   CONSTITUTIONAL: No fever, weight loss, or fatigue  EYES: No eye pain, visual disturbances, or discharge  ENMT:  No difficulty hearing, tinnitus, vertigo; No sinus or throat pain  NECK: No pain or stiffness  BREASTS: No pain, masses, or nipple discharge  RESPIRATORY: No cough, wheezing, chills or hemoptysis; No shortness of breath  CARDIOVASCULAR: No chest pain, palpitations, dizziness, or leg swelling  GASTROINTESTINAL: No abdominal or epigastric pain. No nausea, vomiting, or hematemesis; No diarrhea or constipation. No melena or hematochezia.  GENITOURINARY: No dysuria, frequency, +gross hematuria  NEUROLOGICAL: No headaches, memory loss, loss of strength, numbness, or tremors  SKIN: No itching, burning, rashes, or lesions   LYMPH NODES: No enlarged glands  ENDOCRINE: No heat or cold intolerance; No hair loss  MUSCULOSKELETAL: No joint pain or swelling; No muscle, back, or extremity pain  PSYCHIATRIC: No depression, anxiety, mood swings, or difficulty sleeping  HEME/LYMPH: No easy bruising, or bleeding gums  ALLERY AND IMMUNOLOGIC: No hives or eczema    Allergies    No Known Allergies    Intolerances    Social History: drinks 1-2 drinks wine/liquor monthly, former smoker 1/2 ppd x35 years, quit in 1982    FAMILY HISTORY:  Family history of coronary artery disease      Home Medications:  aspirin 81 mg oral tablet: 1 tab(s) orally once a dayam - last dose 2 months ago (17 Jan 2018 09:27)  cloNIDine 0.1 mg oral tablet: orally once a day,am (17 Jan 2018 09:27)  Crestor 20 mg oral tablet: 1 tab(s) orally once a day (at bedtime) (17 Jan 2018 09:27)  Flomax 0.4 mg oral capsule: orally 2 times a day (17 Jan 2018 09:27)  lidocaine 2% topical gel with applicator: 1 application topically 5 times a day, As needed, catheter pain (17 Jan 2018 17:54)  metFORMIN 500 mg oral tablet: orally once a day (17 Jan 2018 09:27)  metoprolol tartrate 25 mg oral tablet: 1 tab(s) orally 2 times a day (17 Jan 2018 09:27)  Namenda 10 mg oral tablet: 1 tab(s) orally 2 times a day (17 Jan 2018 09:27)  Tribenzor 40 mg-10 mg-25 mg oral tablet: 1 tab(s) orally once a day (17 Jan 2018 09:27)  vitamin D: 02538 unit(s) orally once a week (17 Jan 2018 09:27)      MEDICATIONS  (STANDING):  amLODIPine   Tablet 10 milliGRAM(s) Oral daily  atorvastatin 80 milliGRAM(s) Oral at bedtime  ceFAZolin   IVPB 2000 milliGRAM(s) IV Intermittent every 8 hours  cloNIDine 0.1 milliGRAM(s) Oral daily  dextrose 5%. 1000 milliLiter(s) (50 mL/Hr) IV Continuous <Continuous>  dextrose 50% Injectable 12.5 Gram(s) IV Push once  dextrose 50% Injectable 25 Gram(s) IV Push once  dextrose 50% Injectable 25 Gram(s) IV Push once  docusate sodium 100 milliGRAM(s) Oral three times a day  heparin  Injectable 5000 Unit(s) SubCutaneous every 8 hours  insulin lispro (HumaLOG) corrective regimen sliding scale   SubCutaneous three times a day before meals  insulin lispro (HumaLOG) corrective regimen sliding scale   SubCutaneous at bedtime  lactated ringers. 1000 milliLiter(s) (75 mL/Hr) IV Continuous <Continuous>  losartan 100 milliGRAM(s) Oral daily  memantine 10 milliGRAM(s) Oral two times a day  metoprolol     tartrate 25 milliGRAM(s) Oral two times a day  oxybutynin 5 milliGRAM(s) Oral every 8 hours  senna 1 Tablet(s) Oral at bedtime  tamsulosin 0.4 milliGRAM(s) Oral two times a day    MEDICATIONS  (PRN):  acetaminophen   Tablet. 650 milliGRAM(s) Oral every 6 hours PRN Mild Pain (1 - 3)  dextrose Gel 1 Dose(s) Oral once PRN Blood Glucose LESS THAN 70 milliGRAM(s)/deciliter  glucagon  Injectable 1 milliGRAM(s) IntraMuscular once PRN Glucose LESS THAN 70 milligrams/deciliter  lidocaine 2% Gel 1 Application(s) Topical five times a day PRN catheter pain      Vital Signs Last 24 Hrs  T(C): 37.2 (18 Jan 2018 09:43), Max: 37.2 (18 Jan 2018 09:43)  T(F): 98.9 (18 Jan 2018 09:43), Max: 98.9 (18 Jan 2018 09:43)  HR: 69 (18 Jan 2018 09:43) (55 - 76)  BP: 112/52 (18 Jan 2018 09:43) (107/57 - 164/83)  BP(mean): --  RR: 17 (18 Jan 2018 09:43) (10 - 22)  SpO2: 97% (18 Jan 2018 09:43) (93% - 100%)  CAPILLARY BLOOD GLUCOSE      POCT Blood Glucose.: 152 mg/dL (18 Jan 2018 08:53)  POCT Blood Glucose.: 141 mg/dL (17 Jan 2018 22:34)    I&O's Summary    17 Jan 2018 07:01  -  18 Jan 2018 07:00  --------------------------------------------------------  IN: 785 mL / OUT: 0 mL / NET: 785 mL    18 Jan 2018 07:01  -  18 Jan 2018 11:00  --------------------------------------------------------  IN: 6000 mL / OUT: 6500 mL / NET: -500 mL        PHYSICAL EXAM:  GENERAL: NAD, well-developed, obese  HEAD:  Atraumatic, Normocephalic  EYES: EOMI, PERRLA, conjunctiva and sclera clear  NECK: Supple, No JVD  CHEST/LUNG: Clear to auscultation bilaterally; No wheeze  HEART: Regular rate and rhythm; No murmurs, rubs, or gallops  ABDOMEN: Soft, Nontender, obese  : varela with gross hematuria, on CBI  EXTREMITIES:  2+ Peripheral Pulses, No clubbing, cyanosis, or edema  PSYCH: AAOx3  NEUROLOGY: non-focal  SKIN: No rashes or lesions    LABS:                        11.5   5.92  )-----------( 142      ( 18 Jan 2018 06:30 )             35.9     01-18    139  |  102  |  15  ----------------------------<  167<H>  4.1   |  26  |  1.01    Ca    8.0<L>      18 Jan 2018 06:30    Preop cardiac workup  EKG (1/10/18): NSR, first degree AV block, RBBB, HR 75, TWI III and aVF  Echo (6/20/17): normal LVEF 55%, mild diastolic dysfxn, mild aortic sclerosis  Nuclear stress test (4/21/16): no myocardial ischemia     Microbiology     RADIOLOGY & ADDITIONAL TESTS:    Imaging Personally Reviewed:  < from: CT Abdomen and Pelvis w/wo IV Cont (12.07.17 @ 13:16) >  KIDNEYS/URETERS: A 7 mm left renal calculus. No urothelial lesion   identified.    BLADDER: Multifocal masslike bladder nodules.  REPRODUCTIVE ORGANS: The prostate is unremarkable.    BOWEL: No bowel obstruction. Appendix is normal.  PERITONEUM: No ascites.  VESSELS:Within normal limits.  RETROPERITONEUM: No lymphadenopathy.    ABDOMINAL WALL: Fat-containing umbilical hernia.  BONES: Small lucent lesion in the left posterior ninth rib.    IMPRESSION:   Multifocal bladder cancer.    Small lucent lesion in the left ninth rib.    Subcentimeter left renal calculus.    Consultant(s) Notes Reviewed:      Care Discussed with Consultants/Other Providers:  CHANDRA Lara, on CBI for hematuria s/p TURBT for large bladder tumor

## 2018-01-18 NOTE — DISCHARGE NOTE ADULT - ADDITIONAL INSTRUCTIONS
Home with varela catheter  Follow up with Dr. Yun next Thursday for removal of varela and trial of void in [facility]. Office  [phone].  Call the office if you have fever greater than 101, difficulty urinating, pain not relieved with pain medication, nausea/vomiting.  Change to small varela bag during day and large at night as instructed.  If any problems with catheter -increased bleeding or not draining call [phone] for assistance Home with varela catheter  Follow up with Dr. Yun next Tuesday for removal of varela and trial of void in [facility]. Office  [phone].  Call the office if you have fever greater than 101, difficulty urinating, pain not relieved with pain medication, nausea/vomiting.  Change to small varela bag during day and large at night as instructed.  If any problems with catheter -increased bleeding or not draining call [phone] for assistance

## 2018-01-19 VITALS
HEART RATE: 78 BPM | SYSTOLIC BLOOD PRESSURE: 115 MMHG | TEMPERATURE: 99 F | DIASTOLIC BLOOD PRESSURE: 52 MMHG | OXYGEN SATURATION: 98 % | RESPIRATION RATE: 16 BRPM

## 2018-01-19 LAB
BLD GP AB SCN SERPL QL: NEGATIVE — SIGNIFICANT CHANGE UP
BUN SERPL-MCNC: 16 MG/DL — SIGNIFICANT CHANGE UP (ref 7–23)
CALCIUM SERPL-MCNC: 8.1 MG/DL — LOW (ref 8.4–10.5)
CHLORIDE SERPL-SCNC: 105 MMOL/L — SIGNIFICANT CHANGE UP (ref 98–107)
CO2 SERPL-SCNC: 27 MMOL/L — SIGNIFICANT CHANGE UP (ref 22–31)
CREAT SERPL-MCNC: 0.97 MG/DL — SIGNIFICANT CHANGE UP (ref 0.5–1.3)
GLUCOSE BLDC GLUCOMTR-MCNC: 205 MG/DL — HIGH (ref 70–99)
GLUCOSE SERPL-MCNC: 161 MG/DL — HIGH (ref 70–99)
HCT VFR BLD CALC: 29 % — LOW (ref 39–50)
HGB BLD-MCNC: 9.3 G/DL — LOW (ref 13–17)
MCHC RBC-ENTMCNC: 29 PG — SIGNIFICANT CHANGE UP (ref 27–34)
MCHC RBC-ENTMCNC: 32.1 % — SIGNIFICANT CHANGE UP (ref 32–36)
MCV RBC AUTO: 90.3 FL — SIGNIFICANT CHANGE UP (ref 80–100)
NRBC # FLD: 0 — SIGNIFICANT CHANGE UP
PLATELET # BLD AUTO: 131 K/UL — LOW (ref 150–400)
PMV BLD: 11.8 FL — SIGNIFICANT CHANGE UP (ref 7–13)
POTASSIUM SERPL-MCNC: 4.1 MMOL/L — SIGNIFICANT CHANGE UP (ref 3.5–5.3)
POTASSIUM SERPL-SCNC: 4.1 MMOL/L — SIGNIFICANT CHANGE UP (ref 3.5–5.3)
RBC # BLD: 3.21 M/UL — LOW (ref 4.2–5.8)
RBC # FLD: 14.1 % — SIGNIFICANT CHANGE UP (ref 10.3–14.5)
RH IG SCN BLD-IMP: POSITIVE — SIGNIFICANT CHANGE UP
SODIUM SERPL-SCNC: 141 MMOL/L — SIGNIFICANT CHANGE UP (ref 135–145)
SURGICAL PATHOLOGY STUDY: SIGNIFICANT CHANGE UP
WBC # BLD: 6.7 K/UL — SIGNIFICANT CHANGE UP (ref 3.8–10.5)
WBC # FLD AUTO: 6.7 K/UL — SIGNIFICANT CHANGE UP (ref 3.8–10.5)

## 2018-01-19 PROCEDURE — 99233 SBSQ HOSP IP/OBS HIGH 50: CPT

## 2018-01-19 RX ORDER — ASCORBIC ACID 60 MG
500 TABLET,CHEWABLE ORAL DAILY
Qty: 0 | Refills: 0 | Status: DISCONTINUED | OUTPATIENT
Start: 2018-01-19 | End: 2018-01-19

## 2018-01-19 RX ORDER — SODIUM CHLORIDE 9 MG/ML
1000 INJECTION INTRAMUSCULAR; INTRAVENOUS; SUBCUTANEOUS ONCE
Qty: 0 | Refills: 0 | Status: COMPLETED | OUTPATIENT
Start: 2018-01-19 | End: 2018-01-19

## 2018-01-19 RX ORDER — FERROUS SULFATE 325(65) MG
325 TABLET ORAL DAILY
Qty: 0 | Refills: 0 | Status: DISCONTINUED | OUTPATIENT
Start: 2018-01-19 | End: 2018-01-19

## 2018-01-19 RX ORDER — FOLIC ACID 0.8 MG
1 TABLET ORAL DAILY
Qty: 0 | Refills: 0 | Status: DISCONTINUED | OUTPATIENT
Start: 2018-01-19 | End: 2018-01-19

## 2018-01-19 RX ADMIN — Medication 5 MILLIGRAM(S): at 05:53

## 2018-01-19 RX ADMIN — TAMSULOSIN HYDROCHLORIDE 0.4 MILLIGRAM(S): 0.4 CAPSULE ORAL at 05:57

## 2018-01-19 RX ADMIN — SODIUM CHLORIDE 1000 MILLILITER(S): 9 INJECTION INTRAMUSCULAR; INTRAVENOUS; SUBCUTANEOUS at 08:27

## 2018-01-19 RX ADMIN — MEMANTINE HYDROCHLORIDE 10 MILLIGRAM(S): 10 TABLET ORAL at 05:53

## 2018-01-19 RX ADMIN — Medication 25 MILLIGRAM(S): at 05:53

## 2018-01-19 RX ADMIN — HEPARIN SODIUM 5000 UNIT(S): 5000 INJECTION INTRAVENOUS; SUBCUTANEOUS at 05:53

## 2018-01-19 RX ADMIN — Medication 4: at 09:04

## 2018-01-19 RX ADMIN — Medication 100 MILLIGRAM(S): at 05:53

## 2018-01-19 NOTE — PROGRESS NOTE ADULT - ASSESSMENT
67 yr old South Sudanese speaking male with h/o HTN, DM-2, dyslipidemia, CAD s/p MI (no stent), CVA ( 2010) without residual deficit on ASA (plavix discontinued 2 months ago due to gross hematuria), BPH, BPH, bladder cancer s/p TURBT 5/24/16 at [facility],s/p cystoscopy and TURBT of large bladder tumor on 1/17/2018, postop gross hematuria s/p CBI

## 2018-01-19 NOTE — PROGRESS NOTE ADULT - SUBJECTIVE AND OBJECTIVE BOX
POD #2    Afeb 105/62  69  96%RA    Pt has no c/o    Abd- soft NT ND     Kuo/ CBI slow drip pink; was off earlier and got punch-colored

## 2018-01-19 NOTE — PROGRESS NOTE ADULT - PROBLEM SELECTOR PLAN 2
-h/o CAD and MI, no stent  -c/w cardiac meds (lopressor 25 mg bid, losartan 100 mg qd, statin)  -resume ASA 81 mg when cleared by   Preop cardiac workup  EKG (1/10/18): NSR, first degree AV block, RBBB, HR 75, TWI III and aVF  Echo (6/20/17): normal LVEF 55%, mild diastolic dysfxn, mild aortic sclerosis  Nuclear stress test (4/21/16): no myocardial ischemia.

## 2018-01-19 NOTE — PROGRESS NOTE ADULT - PROBLEM SELECTOR PLAN 1
-s/p cysto/TURBT of large bladder tumor on 1/17  -progressing postop, diet advanced to regular, pain control, bowel regimen, incentive spirometry and early mobilization  -off CBI, d/c plan home today per primary team

## 2018-01-19 NOTE — PROGRESS NOTE ADULT - SUBJECTIVE AND OBJECTIVE BOX
CHIEF COMPLAINT: Patient is a 67y old  male who presents with a chief complaint of admitted for treatment of bladder tumor via TURBT (18 Jan 2018 11:26)      SUBJECTIVE / OVERNIGHT EVENTS:  pt feels ok, denies chest pain or sob, going home today    MEDICATIONS  (STANDING):  amLODIPine   Tablet 10 milliGRAM(s) Oral daily  ascorbic acid 500 milliGRAM(s) Oral daily  atorvastatin 80 milliGRAM(s) Oral at bedtime  cloNIDine 0.1 milliGRAM(s) Oral daily  dextrose 5%. 1000 milliLiter(s) (50 mL/Hr) IV Continuous <Continuous>  dextrose 50% Injectable 12.5 Gram(s) IV Push once  dextrose 50% Injectable 25 Gram(s) IV Push once  dextrose 50% Injectable 25 Gram(s) IV Push once  docusate sodium 100 milliGRAM(s) Oral three times a day  ferrous    sulfate 325 milliGRAM(s) Oral daily  folic acid 1 milliGRAM(s) Oral daily  heparin  Injectable 5000 Unit(s) SubCutaneous every 8 hours  insulin lispro (HumaLOG) corrective regimen sliding scale   SubCutaneous three times a day before meals  insulin lispro (HumaLOG) corrective regimen sliding scale   SubCutaneous at bedtime  losartan 100 milliGRAM(s) Oral daily  memantine 10 milliGRAM(s) Oral two times a day  metoprolol     tartrate 25 milliGRAM(s) Oral two times a day  oxybutynin 5 milliGRAM(s) Oral every 8 hours  senna 1 Tablet(s) Oral at bedtime  tamsulosin 0.4 milliGRAM(s) Oral two times a day    MEDICATIONS  (PRN):  acetaminophen   Tablet. 650 milliGRAM(s) Oral every 6 hours PRN Mild Pain (1 - 3)  dextrose Gel 1 Dose(s) Oral once PRN Blood Glucose LESS THAN 70 milliGRAM(s)/deciliter  glucagon  Injectable 1 milliGRAM(s) IntraMuscular once PRN Glucose LESS THAN 70 milligrams/deciliter  lidocaine 2% Gel 1 Application(s) Topical five times a day PRN catheter pain      VITALS:  T(F): 98.9 (01-19-18 @ 10:05), Max: 99.3 (01-18-18 @ 22:01)  HR: 78 (01-19-18 @ 10:05) (63 - 78)  BP: 115/52 (01-19-18 @ 10:05) (101/66 - 118/58)  RR: 16 (01-19-18 @ 10:05) (16 - 18)  SpO2: 98% (01-19-18 @ 10:05)      CAPILLARY BLOOD GLUCOSE    Output     I&O's Summary  T(F): 98.9 (01-19-18 @ 10:05), Max: 99.3 (01-18-18 @ 22:01)  HR: 78 (01-19-18 @ 10:05) (63 - 78)  BP: 115/52 (01-19-18 @ 10:05) (101/66 - 118/58)  RR: 16 (01-19-18 @ 10:05) (16 - 18)  SpO2: 98% (01-19-18 @ 10:05)    PHYSICAL EXAM:  GENERAL: NAD, well-developed, obese  HEAD:  Atraumatic, Normocephalic  EYES: EOMI, PERRLA, conjunctiva and sclera clear  NECK: Supple, No JVD  CHEST/LUNG: Clear to auscultation bilaterally; No wheeze  HEART: Regular rate and rhythm; No murmurs, rubs, or gallops  ABDOMEN: Soft, Nontender, nondistended,  obese  : avery with hematuria, now off CBI  EXTREMITIES:  2+ Peripheral Pulses, No clubbing, cyanosis, or edema  PSYCH: AAOx3  NEUROLOGY: non-focal  SKIN: No rashes or lesions      LABS:              9.3                  141  | 27   | 16           6.70  >-----------< 131     ------------------------< 161                   29.0                 4.1  | 105  | 0.97                                         Ca 8.1   Mg x     Ph x        MICROBIOLOGY:    RADIOLOGY & ADDITIONAL TESTS:    Imaging Personally Reviewed:    [ ] Consultant(s) Notes Reviewed:  [ x] Care Discussed with Consultants/Other Providers: KARIME Lara, off CBI, d/c home today

## 2018-01-19 NOTE — PROGRESS NOTE ADULT - PROBLEM SELECTOR PLAN 3
- in the setting of surgery/hematuria  -monitor CBC, transfuse prn, keep Hgb>8 with cardiac disease.  -Hgb 9.3, no indication for transfusion

## 2018-01-22 DIAGNOSIS — R69 ILLNESS, UNSPECIFIED: ICD-10-CM

## 2018-01-23 ENCOUNTER — APPOINTMENT (OUTPATIENT)
Dept: UROLOGY | Facility: CLINIC | Age: 68
End: 2018-01-23
Payer: MEDICARE

## 2018-01-23 PROCEDURE — 99024 POSTOP FOLLOW-UP VISIT: CPT

## 2018-01-25 ENCOUNTER — APPOINTMENT (OUTPATIENT)
Dept: UROLOGY | Facility: CLINIC | Age: 68
End: 2018-01-25

## 2018-02-20 ENCOUNTER — OUTPATIENT (OUTPATIENT)
Dept: OUTPATIENT SERVICES | Facility: HOSPITAL | Age: 68
LOS: 1 days | End: 2018-02-20
Payer: MEDICARE

## 2018-02-20 VITALS
HEIGHT: 65 IN | TEMPERATURE: 97 F | HEART RATE: 65 BPM | WEIGHT: 229.94 LBS | RESPIRATION RATE: 16 BRPM | DIASTOLIC BLOOD PRESSURE: 62 MMHG | SYSTOLIC BLOOD PRESSURE: 120 MMHG

## 2018-02-20 DIAGNOSIS — C67.9 MALIGNANT NEOPLASM OF BLADDER, UNSPECIFIED: ICD-10-CM

## 2018-02-20 DIAGNOSIS — I10 ESSENTIAL (PRIMARY) HYPERTENSION: ICD-10-CM

## 2018-02-20 DIAGNOSIS — E11.9 TYPE 2 DIABETES MELLITUS WITHOUT COMPLICATIONS: ICD-10-CM

## 2018-02-20 DIAGNOSIS — Z98.49 CATARACT EXTRACTION STATUS, UNSPECIFIED EYE: Chronic | ICD-10-CM

## 2018-02-20 DIAGNOSIS — Z98.89 OTHER SPECIFIED POSTPROCEDURAL STATES: Chronic | ICD-10-CM

## 2018-02-20 DIAGNOSIS — I25.2 OLD MYOCARDIAL INFARCTION: ICD-10-CM

## 2018-02-20 DIAGNOSIS — N40.0 BENIGN PROSTATIC HYPERPLASIA WITHOUT LOWER URINARY TRACT SYMPTOMS: ICD-10-CM

## 2018-02-20 LAB
APPEARANCE UR: SIGNIFICANT CHANGE UP
BACTERIA # UR AUTO: SIGNIFICANT CHANGE UP
BILIRUB UR-MCNC: NEGATIVE — SIGNIFICANT CHANGE UP
BLOOD UR QL VISUAL: HIGH
COLOR SPEC: HIGH
GLUCOSE UR-MCNC: NEGATIVE — SIGNIFICANT CHANGE UP
HCT VFR BLD CALC: 36.2 % — LOW (ref 39–50)
HGB BLD-MCNC: 11 G/DL — LOW (ref 13–17)
KETONES UR-MCNC: NEGATIVE — SIGNIFICANT CHANGE UP
LEUKOCYTE ESTERASE UR-ACNC: HIGH
MCHC RBC-ENTMCNC: 25.8 PG — LOW (ref 27–34)
MCHC RBC-ENTMCNC: 30.4 % — LOW (ref 32–36)
MCV RBC AUTO: 85 FL — SIGNIFICANT CHANGE UP (ref 80–100)
MUCOUS THREADS # UR AUTO: SIGNIFICANT CHANGE UP
NITRITE UR-MCNC: NEGATIVE — SIGNIFICANT CHANGE UP
NON-SQ EPI CELLS # UR AUTO: <1 — SIGNIFICANT CHANGE UP
NRBC # FLD: 0 — SIGNIFICANT CHANGE UP
PH UR: 6 — SIGNIFICANT CHANGE UP (ref 4.6–8)
PLATELET # BLD AUTO: 222 K/UL — SIGNIFICANT CHANGE UP (ref 150–400)
PMV BLD: 11.7 FL — SIGNIFICANT CHANGE UP (ref 7–13)
PROT UR-MCNC: 100 MG/DL — SIGNIFICANT CHANGE UP
RBC # BLD: 4.26 M/UL — SIGNIFICANT CHANGE UP (ref 4.2–5.8)
RBC # FLD: 14.6 % — HIGH (ref 10.3–14.5)
RBC CASTS # UR COMP ASSIST: >50 — HIGH (ref 0–?)
SP GR SPEC: 1.02 — SIGNIFICANT CHANGE UP (ref 1–1.04)
SQUAMOUS # UR AUTO: SIGNIFICANT CHANGE UP
UROBILINOGEN FLD QL: NORMAL MG/DL — SIGNIFICANT CHANGE UP
WBC # BLD: 4.59 K/UL — SIGNIFICANT CHANGE UP (ref 3.8–10.5)
WBC # FLD AUTO: 4.59 K/UL — SIGNIFICANT CHANGE UP (ref 3.8–10.5)
WBC CLUMPS #/AREA URNS HPF: PRESENT — HIGH (ref 0–?)
WBC UR QL: >50 — HIGH (ref 0–?)

## 2018-02-20 PROCEDURE — 93010 ELECTROCARDIOGRAM REPORT: CPT

## 2018-02-20 RX ORDER — METFORMIN HYDROCHLORIDE 850 MG/1
0 TABLET ORAL
Qty: 0 | Refills: 0 | COMMUNITY

## 2018-02-20 RX ORDER — SODIUM CHLORIDE 9 MG/ML
1000 INJECTION, SOLUTION INTRAVENOUS
Qty: 0 | Refills: 0 | Status: DISCONTINUED | OUTPATIENT
Start: 2018-02-28 | End: 2018-03-15

## 2018-02-20 RX ORDER — OLMESARTAN MEDOXOMIL / AMLODIPINE BESYLATE / HYDROCHLOROTHIAZIDE 40; 10; 25 MG/1; MG/1; MG/1
1 TABLET, FILM COATED ORAL
Qty: 0 | Refills: 0 | COMMUNITY

## 2018-02-20 RX ORDER — TAMSULOSIN HYDROCHLORIDE 0.4 MG/1
0 CAPSULE ORAL
Qty: 0 | Refills: 0 | COMMUNITY

## 2018-02-20 RX ORDER — MEMANTINE HYDROCHLORIDE 10 MG/1
1 TABLET ORAL
Qty: 0 | Refills: 0 | COMMUNITY

## 2018-02-20 RX ORDER — METOPROLOL TARTRATE 50 MG
0 TABLET ORAL
Qty: 0 | Refills: 0 | COMMUNITY

## 2018-02-20 RX ORDER — METOPROLOL TARTRATE 50 MG
1 TABLET ORAL
Qty: 0 | Refills: 0 | COMMUNITY

## 2018-02-20 RX ORDER — ROSUVASTATIN CALCIUM 5 MG/1
1 TABLET ORAL
Qty: 0 | Refills: 0 | COMMUNITY

## 2018-02-20 RX ORDER — TAMSULOSIN HYDROCHLORIDE 0.4 MG/1
1 CAPSULE ORAL
Qty: 0 | Refills: 0 | COMMUNITY

## 2018-02-20 RX ORDER — ASPIRIN/CALCIUM CARB/MAGNESIUM 324 MG
1 TABLET ORAL
Qty: 0 | Refills: 0 | COMMUNITY

## 2018-02-20 NOTE — H&P PST ADULT - NEGATIVE NEUROLOGICAL SYMPTOMS
no weakness/no transient paralysis/no difficulty walking/no headache/no paresthesias/no generalized seizures

## 2018-02-20 NOTE — H&P PST ADULT - HISTORY OF PRESENT ILLNESS
66 yo male with PMH HTN, DM2, HLD, CAD, h/o MI, CVA ( 2010) on low dose aspirin, BPH, malignant neoplasm of the urinary bladder s/p TURBT 5/24/16 at [facility] and TURBT 1/17/2018 presents to pre surgical testing. Pt is scheduled for restaging transureteral resection bladder tumor on 2/28/18.  Patient is poor historian, easily irritated and impatient, unable to gather much information. 66 yo male with PMH HTN, DM2, HLD, CAD, h/o MI, CVA ( 2010) on low dose aspirin, BPH, malignant neoplasm of the urinary bladder s/p TURBT 5/24/16 at [facility] and TURBT 1/17/2018 presents to pre surgical testing. Pt is scheduled for restaging transurethral resection bladder tumor on 2/28/18.  Patient is a poor historian, easily irritated and impatient, unable to gather much information.

## 2018-02-20 NOTE — H&P PST ADULT - NEGATIVE OPHTHALMOLOGIC SYMPTOMS
no diplopia/no photophobia/no lacrimation L/no lacrimation R/no pain R/no blurred vision R/no pain L/no blurred vision L

## 2018-02-20 NOTE — H&P PST ADULT - NEGATIVE ENMT SYMPTOMS
no hearing difficulty/no vertigo/no throat pain/no nasal congestion/no sinus symptoms/no ear pain/no dysphagia/no tinnitus

## 2018-02-20 NOTE — H&P PST ADULT - PROBLEM SELECTOR PLAN 2
Pt instructed to take metoprolol and clonidine AM of surgery with a sip of water.   Pt met STOP BANG score for SORAIDA precaution. OR booking notified via fax.

## 2018-02-20 NOTE — H&P PST ADULT - NEGATIVE MUSCULOSKELETAL SYMPTOMS
no muscle weakness/no neck pain/no back pain/no leg pain R/no joint swelling/no arm pain R/no leg pain L/no arthralgia/no stiffness/no arm pain L/no myalgia/no muscle cramps

## 2018-02-20 NOTE — H&P PST ADULT - GENERAL GENITOURINARY SYMPTOMS
increased urinary frequency/intermittent/hematuria/nocturia nocturia/intermittent hematuria and incontinence/increased urinary frequency

## 2018-02-20 NOTE — H&P PST ADULT - PROBLEM SELECTOR PLAN 1
Pt is scheduled for restaging transurethral resection bladder tumor on 2/28/18.  Pre op instructions given and pt able to verbalize understanding.   Pt obtained medical eval preop as instructed by surgeon, will request document.  Pt reports last dose of aspirin is tomorrow as instructed by surgeon and PCP.

## 2018-02-20 NOTE — H&P PST ADULT - PMH
BPH (benign prostatic hyperplasia)    Coronary artery disease    History of MI (myocardial infarction)  previously on Plavix, has been d/c'ed for >1 year  History of stroke  2010, no residual  Hyperlipidemia    Hypertension    Malignant neoplasm of bladder  multiple bladder tumors  Obesity    Type 2 diabetes mellitus    UTI (urinary tract infection)  treated in May 2016 BPH (benign prostatic hyperplasia)    Coronary artery disease    History of MI (myocardial infarction)  previously on Plavix, has been d/c'ed for >1 year  History of stroke  2010, no residual  Hyperlipidemia    Hypertension    Malignant neoplasm of bladder  multiple bladder tumors  Obesity    Type 2 diabetes mellitus    UTI (urinary tract infection)  prescribed levofloxacin 2/15 by PCP  UTI (urinary tract infection)  treated in May 2016

## 2018-02-20 NOTE — H&P PST ADULT - NEGATIVE CARDIOVASCULAR SYMPTOMS
no paroxysmal nocturnal dyspnea/no orthopnea/no claudication/no dyspnea on exertion/no peripheral edema/no palpitations/no chest pain

## 2018-02-20 NOTE — H&P PST ADULT - PSH
History of bladder surgery  TURBT 5/24/16 & 8/2016 & 1/2018  S/P cataract surgery  bilateral  S/P inguinal hernia repair  bilateral

## 2018-02-21 LAB — SPECIMEN SOURCE: SIGNIFICANT CHANGE UP

## 2018-02-22 LAB — BACTERIA UR CULT: SIGNIFICANT CHANGE UP

## 2018-02-27 NOTE — ASU PATIENT PROFILE, ADULT - PMH
BPH (benign prostatic hyperplasia)    Coronary artery disease    History of MI (myocardial infarction)  previously on Plavix, has been d/c'ed for >1 year  History of stroke  2010, no residual  Hyperlipidemia    Hypertension    Malignant neoplasm of bladder  multiple bladder tumors  Obesity    Type 2 diabetes mellitus    UTI (urinary tract infection)  prescribed levofloxacin 2/15 by PCP  UTI (urinary tract infection)  treated in May 2016

## 2018-02-28 ENCOUNTER — APPOINTMENT (OUTPATIENT)
Dept: UROLOGY | Facility: HOSPITAL | Age: 68
End: 2018-02-28

## 2018-02-28 ENCOUNTER — OUTPATIENT (OUTPATIENT)
Dept: OUTPATIENT SERVICES | Facility: HOSPITAL | Age: 68
LOS: 1 days | Discharge: HOME | End: 2018-02-28
Payer: MEDICARE

## 2018-02-28 ENCOUNTER — RESULT REVIEW (OUTPATIENT)
Age: 68
End: 2018-02-28

## 2018-02-28 ENCOUNTER — TRANSCRIPTION ENCOUNTER (OUTPATIENT)
Age: 68
End: 2018-02-28

## 2018-02-28 VITALS
RESPIRATION RATE: 16 BRPM | SYSTOLIC BLOOD PRESSURE: 134 MMHG | WEIGHT: 229.94 LBS | HEIGHT: 65 IN | HEART RATE: 61 BPM | TEMPERATURE: 98 F | DIASTOLIC BLOOD PRESSURE: 59 MMHG | OXYGEN SATURATION: 98 %

## 2018-02-28 VITALS
SYSTOLIC BLOOD PRESSURE: 126 MMHG | OXYGEN SATURATION: 97 % | HEART RATE: 53 BPM | RESPIRATION RATE: 16 BRPM | DIASTOLIC BLOOD PRESSURE: 62 MMHG

## 2018-02-28 DIAGNOSIS — C67.9 MALIGNANT NEOPLASM OF BLADDER, UNSPECIFIED: ICD-10-CM

## 2018-02-28 DIAGNOSIS — Z98.89 OTHER SPECIFIED POSTPROCEDURAL STATES: Chronic | ICD-10-CM

## 2018-02-28 DIAGNOSIS — Z98.49 CATARACT EXTRACTION STATUS, UNSPECIFIED EYE: Chronic | ICD-10-CM

## 2018-02-28 LAB — GLUCOSE BLDC GLUCOMTR-MCNC: 111 MG/DL — HIGH (ref 70–99)

## 2018-02-28 PROCEDURE — 88307 TISSUE EXAM BY PATHOLOGIST: CPT | Mod: 26

## 2018-02-28 PROCEDURE — 52240 CYSTOSCOPY AND TREATMENT: CPT

## 2018-02-28 RX ORDER — LIDOCAINE 4 G/100G
1 CREAM TOPICAL
Qty: 0 | Refills: 0 | COMMUNITY
Start: 2018-02-28

## 2018-02-28 RX ORDER — ROSUVASTATIN CALCIUM 5 MG/1
1 TABLET ORAL
Qty: 0 | Refills: 0 | COMMUNITY

## 2018-02-28 RX ORDER — SODIUM CHLORIDE 9 MG/ML
1000 INJECTION, SOLUTION INTRAVENOUS
Qty: 0 | Refills: 0 | Status: DISCONTINUED | OUTPATIENT
Start: 2018-02-28 | End: 2018-03-15

## 2018-02-28 RX ORDER — METOPROLOL TARTRATE 50 MG
1 TABLET ORAL
Qty: 0 | Refills: 0 | COMMUNITY

## 2018-02-28 RX ORDER — CEPHALEXIN 500 MG
1 CAPSULE ORAL
Qty: 9 | Refills: 0 | OUTPATIENT
Start: 2018-02-28 | End: 2018-03-02

## 2018-02-28 RX ORDER — LIDOCAINE 4 G/100G
1 CREAM TOPICAL
Qty: 0 | Refills: 0 | Status: DISCONTINUED | OUTPATIENT
Start: 2018-02-28 | End: 2018-03-15

## 2018-02-28 RX ORDER — FENTANYL CITRATE 50 UG/ML
25 INJECTION INTRAVENOUS
Qty: 0 | Refills: 0 | Status: DISCONTINUED | OUTPATIENT
Start: 2018-02-28 | End: 2018-02-28

## 2018-02-28 RX ORDER — TAMSULOSIN HYDROCHLORIDE 0.4 MG/1
1 CAPSULE ORAL
Qty: 0 | Refills: 0 | COMMUNITY

## 2018-02-28 RX ORDER — METFORMIN HYDROCHLORIDE 850 MG/1
1 TABLET ORAL
Qty: 0 | Refills: 0 | COMMUNITY

## 2018-02-28 RX ORDER — FENTANYL CITRATE 50 UG/ML
50 INJECTION INTRAVENOUS
Qty: 0 | Refills: 0 | Status: DISCONTINUED | OUTPATIENT
Start: 2018-02-28 | End: 2018-02-28

## 2018-02-28 RX ORDER — OLMESARTAN MEDOXOMIL / AMLODIPINE BESYLATE / HYDROCHLOROTHIAZIDE 40; 10; 25 MG/1; MG/1; MG/1
1 TABLET, FILM COATED ORAL
Qty: 0 | Refills: 0 | COMMUNITY

## 2018-02-28 RX ORDER — ASPIRIN/CALCIUM CARB/MAGNESIUM 324 MG
1 TABLET ORAL
Qty: 0 | Refills: 0 | COMMUNITY

## 2018-02-28 NOTE — ASU DISCHARGE PLAN (ADULT/PEDIATRIC). - NURSING INSTRUCTIONS
Your urine color should improve from bloody color to a lighter cranberry as you increase your fluids.  If it doesn't call MD.  If you don't have any urine output for more than 1 hour, drink 2 glasses of fluids and attempt void.  If unable, call MD.

## 2018-02-28 NOTE — ASU DISCHARGE PLAN (ADULT/PEDIATRIC). - MEDICATION SUMMARY - MEDICATIONS TO TAKE
I will START or STAY ON the medications listed below when I get home from the hospital:    aspirin 81 mg oral tablet  -- 1 tab(s) by mouth once a day. last dose 2/21/2018  -- Indication: For home med    cloNIDine 0.1 mg oral tablet  -- 1 tab(s) by mouth once a day  -- Indication: For home med    tamsulosin 0.4 mg oral capsule  -- 1 cap(s) by mouth 2 times a day  -- Indication: For bph    metFORMIN 500 mg oral tablet, extended release  -- 1 tab(s) by mouth once a day  -- Indication: For home med    rosuvastatin 20 mg oral tablet  -- 1 tab(s) by mouth once a day (at bedtime)  -- Indication: For home med    olmesartan/amlodipine/hydrochlorothiazide 40 mg-10 mg-25 mg oral tablet  -- 1 tab(s) by mouth once a day  -- Indication: For home med    metoprolol tartrate 25 mg oral tablet  -- 1  by mouth 2 times a day  -- Indication: For home med    lidocaine 2% topical gel with applicator  -- 1 application on skin 5 times a day, As needed, catheter pain  -- Indication: For catheter/penis tip pain I will START or STAY ON the medications listed below when I get home from the hospital:    aspirin 81 mg oral tablet  -- 1 tab(s) by mouth once a day. last dose 2/21/2018  -- Indication: For home med    cloNIDine 0.1 mg oral tablet  -- 1 tab(s) by mouth once a day  -- Indication: For home med    tamsulosin 0.4 mg oral capsule  -- 1 cap(s) by mouth 2 times a day  -- Indication: For bph    metFORMIN 500 mg oral tablet, extended release  -- 1 tab(s) by mouth once a day  -- Indication: For home med    rosuvastatin 20 mg oral tablet  -- 1 tab(s) by mouth once a day (at bedtime)  -- Indication: For home med    olmesartan/amlodipine/hydrochlorothiazide 40 mg-10 mg-25 mg oral tablet  -- 1 tab(s) by mouth once a day  -- Indication: For home med    metoprolol tartrate 25 mg oral tablet  -- 1  by mouth 2 times a day  -- Indication: For home med    Keflex 500 mg oral capsule  -- 1 cap(s) by mouth every 8 hours   -- Finish all this medication unless otherwise directed by prescriber.    -- Indication: For UTI prevention    lidocaine 2% topical gel with applicator  -- 1 application on skin 5 times a day, As needed, catheter pain  -- Indication: For catheter/penis tip pain

## 2018-02-28 NOTE — ASU PREOP CHECKLIST - 1.
[phone] ambulette  for  told patient he could not use this as tranporation home today son will be able to [phone] ambulette  for  told patient he could not use this as transportation home today son will be able to

## 2018-02-28 NOTE — ASU DISCHARGE PLAN (ADULT/PEDIATRIC). - NOTIFY
Bleeding that does not stop/Fever greater than 101/Unable to Urinate/Persistent Nausea and Vomiting/Pain not relieved by Medications

## 2018-02-28 NOTE — ASU DISCHARGE PLAN (ADULT/PEDIATRIC). - SPECIAL INSTRUCTIONS
You may take over the counter tylenol and/or ibuprofen as directed for pain.     Lidocaine jelly to tip of penis for discomfort     Drink plenty of fluids. You may notice blood in your urine for several days, which is normal.    Take 3 days of antibiotics to prevent urinary tract infection    RESTART your aspirin on ***** if your urine is clear        Dr. Yun would like to see you the end of next week for varela removal . Please call the office to schedule/confirm an appointment.    [facility]  [address] [place] [address] [phone] You may take over the counter tylenol and/or ibuprofen as directed for pain.     Lidocaine jelly to tip of penis for discomfort     Drink plenty of fluids. You may notice blood in your urine for several days, which is normal.    Take 3 days of antibiotics to prevent urinary tract infection    RESTART your aspirin on Saturday if your urine is clear        Dr. Yun would like to see you the end of next week for varela removal . Please call the office to schedule/confirm an appointment.    [facility]  [address] [place] [address] [phone] You may take over the counter tylenol and/or ibuprofen as directed for pain.     Lidocaine jelly to tip of penis for discomfort     Drink plenty of fluids. You may notice blood in your urine for several days, which is normal.    Take 3 days of antibiotics to prevent urinary tract infection    RESTART your aspirin on Saturday if your urine is clear    follow the going home w/a varela catheter info sheet    Dr. Yun would like to see you the end of next week for varela removal . Please call the office to schedule/confirm an appointment.    [facility]  [address] [place] [address] [phone]

## 2018-02-28 NOTE — BRIEF OPERATIVE NOTE - PROCEDURE
<<-----Click on this checkbox to enter Procedure JEMAL, using monopolar cautery  02/28/2018    Active  JEFF

## 2018-03-02 LAB — SURGICAL PATHOLOGY STUDY: SIGNIFICANT CHANGE UP

## 2018-03-08 ENCOUNTER — APPOINTMENT (OUTPATIENT)
Dept: UROLOGY | Facility: CLINIC | Age: 68
End: 2018-03-08
Payer: MEDICARE

## 2018-03-08 PROCEDURE — 99213 OFFICE O/P EST LOW 20 MIN: CPT

## 2018-03-09 DIAGNOSIS — R35.0 FREQUENCY OF MICTURITION: ICD-10-CM

## 2018-03-09 RX ORDER — OXYBUTYNIN CHLORIDE 15 MG/1
15 TABLET, EXTENDED RELEASE ORAL
Qty: 90 | Refills: 0 | Status: ACTIVE | COMMUNITY
Start: 2018-03-09 | End: 1900-01-01

## 2018-03-11 ENCOUNTER — MESSAGE (OUTPATIENT)
Age: 68
End: 2018-03-11

## 2019-04-22 NOTE — H&P PST ADULT - DENTITION
Date of service: 



04/21/2019



PROCEDURE:  CT MAXILLOFACIAL BONES WITHOUT CONTRAST



HISTORY:

pain, swelling to left orbital area and forehead



COMPARISON:

None available.



TECHNIQUE:

Contiguous axial CT  images of the maxillofacial bones were obtained. 

Coronal and sagittal reformats were generated.



Radiation dose:



Total exam DLP = 706.57 mGy-cm.



This CT exam was performed using one or more of the following dose 

reduction techniques: Automated exposure control, adjustment of the 

mA and/or kV according to patient size, and/or use of iterative 

reconstruction technique.



FINDINGS:



NASAL BONES:

No acute fracture.



ORBITS:

The globes are symmetric without evidence for acute orbital injury or 

acute fracture.



PARANASAL SINUSES/ MASTOIDS:

Mild mucosal thickening in the right posterior ethmoid air cell.  The 

remaining included paranasal sinuses and mastoid air cells are clear.



MAXILLA:

No acute maxillofacial fracture.



MANDIBLE/ TEMPOROMANDIBULAR JOINTS:

No acute fracture or dislocation.



SKULL BASE:

Unremarkable.



TEMPORAL BONES:

Middle ears and mastoid grossly unremarkable.



OTHER FINDINGS:

There is moderate sized left frontal and periorbital soft tissue 

hematoma..



IMPRESSION:

No acute nasal bone, orbital or maxillofacial fracture.



Moderate size right frontal and periorbital soft tissue hematoma.



A preliminary report was provided by [facility] [employer] [facility]. Denies loose teeth/normal normal/Denies loose teeth or dentures

## 2019-08-26 NOTE — H&P PST ADULT - BREASTS
Chief Complaint: 30 year old male comes in today for Diabetes Mellitus Referred by Ann E Severson, APNP       Assessment/Plan:  Type 1 diabetes mellitus without complication (CMS/[facility])  Diabetes mellitus type 1, with complication, on long term insulin pump (CMS/HCC)  Insulin pump in place  Hypoglycemia unawareness in type 1 diabetes mellitus (CMS/[facility])  Type 1 diabetes mellitus with diabetic autonomic neuropathy (CMS/HCC)  A1c is at goal. He's been having a good deal of issue with his Medtronic sensor with his fingerstick glucoses not matching his sensor curve. He is using a ReliOn meter rather than the Contour meter due to cost of strips. We'll try to process a Contour test strip prescription to check on cost with his Shruti plan. Due to the inconsistencies between his sensor and his meter, he is asking to replace this with a Dexcom sensor. I agree with this. He does not trust the sensor to be accurate, and the evidence presented today on his download corroborates this.  Orders were placed for a Dexcom sensor replace his Medtronic sensor.  Overall his control is good off the sensor, and he is testing up to 7 times daily without it. He would benefit from the sensor to identify low glucoses as he does have some hypoglycemia unawareness but has a sense of when lows are occurring some of the time. He would benefit from ongoing sensor therapy.  A1c is controlled. Overall glucose average is controlled.  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  Knowledge deficit is not an issue.  He is capable of self adjusting his pump for her glycemia and hypoglycemia. No changes were made to his pump today.      Subsequent to his visit Medtr[employer] diabetes was called to inquire about the difference between his fingerstick readings and his sensor readings. The suggestions they offered to fix this will not be of assistance to the patient, as he is not wearing  tight fitting clothing and is placing his sensor appropriately. They will be sending patient and his sensor.    - POCT GLYCOHEMOGLOBIN  - VENIPUNCTURE FINGER HEEL EAR STICK  - glucagon (GLUCAGON EMERGENCY) 1 MG injection kit; Inject 1 mg into the muscle as needed (hypoglycemia).  Dispense: 1 each; Refill: 12  - GLYCOHEMOGLOBIN; Future  - LIPID PANEL WITH REFLEX; Future  - blood glucose test strip; Test blood sugar 7 times daily as directed. Diagnosis: E10.65. Meter: Contour Next Link  Dispense: 200 each; Refill: 11  - acetone, urine, test strip; Test for Ketones if Glucose is >250 for 2 checks  and it is >3 hours after eating  Dispense: 50 each; Refill: 4         Patient should be testing 5-6 times daily due to medical necessity.  Patient has the following conditions : is on insulin therapy, uses correction factor to determine insulin doses, is on insulin pump, has frequent dose titrations of insulin based on home testing results, current hemoglobin A1c is   Hemoglobin A1C POC   Date Value Ref Range Status   08/27/2019 6.3 (A) 4.5 - 5.6 % Final   07/26/2017 6.7 % Final     Comment:     See scan for reference range and comments.       Lab Results   Component Value Date    HGBA1C 5.8 (H) 03/28/2019   , has hypoglycemia unawareness and need for increased testing is duration of lifetime    - Reviewed with patient the importance of blood glucose control in order to reduce acute & chronic complications of diabetes.  - Diet review - focus on whole grains, fruits & vegetables, low in fats & carbohydrates.  - Encourage to adhere to DM/Renal friendly diet.   - Exercise - goal 30 minutes/day, 5 days/week. Any increase improves diabetes control.  - Patient was instructed to log blood sugar over the next few weeks and bring  readings back to the clinic.   - Encourage to follow up with ophthalmologist as per their recommendations.   - Patient encouraged to do daily foot exam.   - BP well controlled today.       Patient  Instructions   Medtronic Infusion Pump    670G  Basal  0000= 1.0  1000= 0.9  1500 = 0.70  2200 = 0.9  Carbohydrate ratio   0000   7.5  1100   7.0  1600   8.0  Target 100  Sensitivity 30  Active insulin 3 hours     Will order Dexcom.     .Return in about 3 months (around 11/27/2019) for Diabetes, Schedule fasting lab prior.            History of present illness  Type 1 diabetes  Diagnosed:  2006 - Age 17  Weekends are more difficult to control.  Out of Auto mode and doing well right now.  He has sensor data with him and there are obvious areas of inconsistency with glucoses 40-60 points from sensor curve. Notes that this has occurred over 3 weeks and several sensors.    He is declining sensor at present due to this.    Prior hospitalizations related to DM:Yes  DM regimen at home:  Medtronic Infusion Pump    670G   Basal  0000= 1.0  1000= 0.9  1500 = 0.70  2200 = 0.9  Carbohydrate ratio   0000   7.5  1100   7.0  1600   8.0  Target 100  Sensitivity 30  Active insulin 3 hours       Tests glucoses:  4-8  Times daily        Any history of hypoglycemia: Yes - 1 low in 2 weeks.  Has unawareness to some degree, does have a \"gut\" feeling when he is low. These occurred with increased activity.     Had some elevations in the morning - once due to pizza the night before - Will use extended boluses to try to compensate for this.       Latest HbA1c:   Hemoglobin A1C (%)   Date Value   03/28/2019 5.8 (H)     Hemoglobin A1C POC   Date Value Ref Range Status   08/27/2019 6.3 (A) 4.5 - 5.6 % Final   07/26/2017 6.7 % Final     Comment:     See scan for reference range and comments.       Dietary compliance: Good - Vegetables, fruit, meat, non processed foods - All garden food, grass fed beef.   Meals per day: 3 meals plus snacks 100-200 grams per day.  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      Currently holding Vitamin D 02233 iu weekly   Current Vitamin D is   VITAMIND, 25 HYDROXY (ng/mL)   Date Value   03/28/2019 66.9     Currently active at home.    Preventative Care:  HTN:No  On ACE-inhibitors/ARB:No   History of nephropathy: No  Last urine microalbumin/creatinine:  MICROALBUMIN/CREATININE (mg/g)   Date Value   03/28/2019 6.4     History of retinopathy: No  Last eye exam: 03/11/19  Hx of laser surgery: No  Peripheral neuropathy: No  On treatment: No  History of cerebrovascular disease: No  History of hyperlipidemia: No  Medications:n/a  History of coronary artery disease: No - Had a history of Afib with cardioversion 2 years ago.   On aspirin: No  Has ID bracelet:  Yes. Has been informed of its benefit.  History of gastroparesis: No  History of urinary incontinence: No  History of impotence: No  Is patient up-to-date with immunizations including Influenza and Pneumococcal vaccinations? Yes    Lifestyle Modification:  Exercise:Running, starting flag football  Balanced carb-controlled diet:Yes  Counseled on smoking and/or alcohol:Not needed  Self-foot examinations at home: Yes      Past Medical History:   Diagnosis Date   • Acquired hypothyroidism 11/30/2017    antibody negative   • Atrial fibrillation status post cardioversion (CMS/[facility]) 04/14/2016   • Concussion     x3 with wrestling with a bleed in the 7th grade in the occiopital area   • Diabetic autonomic neuropathy (CMS/HCC)     due to type 1 diabetes mellitus   • Epididymitis    • Type 1 diabetes mellitus with hypoglycemia unawareness (CMS/[facility])    • Type 1 diabetes mellitus without complication (CMS/HCC) 11/28/2017   • Type 1 diabetes, controlled, with neuropathy (CMS/HCC)        History reviewed. No pertinent surgical history.    Social History     Socioeconomic History   • Marital status: /Civil Union     Spouse name: Not on file   • Number of children: Not on file    • Years of education: Not on file   • Highest education level: Not on file   Occupational History   • Not on file   Social Needs   • Financial resource strain: Not on file   • Food insecurity:     Worry: Not on file     Inability: Not on file   • Transportation needs:     Medical: Not on file     Non-medical: Not on file   Tobacco Use   • Smoking status: Never Smoker   • Smokeless tobacco: Never Used   Substance and Sexual Activity   • Alcohol use: Yes     Comment: SOCIALLY   • Drug use: Not on file   • Sexual activity: Not on file   Lifestyle   • Physical activity:     Days per week: Not on file     Minutes per session: Not on file   • Stress: Not on file   Relationships   • Social connections:     Talks on phone: Not on file     Gets together: Not on file     Attends [employer] service: Not on file     Active member of club or organization: Not on file     Attends meetings of clubs or organizations: Not on file     Relationship status: Not on file   • Intimate partner violence:     Fear of current or ex partner: Not on file     Emotionally abused: Not on file     Physically abused: Not on file     Forced sexual activity: Not on file   Other Topics Concern   • Not on file   Social History Narrative   • Not on file       Family History   Problem Relation Age of Onset   • Thyroid Mother    • Myocardial Infarction Mother    • Cataracts Mother    • Thyroid Sister    • Diabetes Maternal Uncle        Medications:  Current Outpatient Medications   Medication Sig   • acetone, urine, test strip Test for Ketones if Glucose is >250 for 2 checks  and it is >3 hours after eating   • insulin glargine (LANTUS) 100 UNIT/ML injectable solution Take 17 units nightly in case of pump failure - Will call if needs filled   • Insulin Infusion Pump (MINIMED 670G INSULIN PUMP) Device 1 each Continuous Infusion Pump.   • Insulin Infusion Pump Supplies (MINIMED RESERVOIR 3ML) Misc 1 each every 3 days.   • Subcutaneous Infusion Set (SURE-T  INFUSION SET 6MM) Misc 1 each every 3 days. May chnge every 2-3 days if site problems occur.   • insulin lispro (HUMALOG) 100 UNIT/ML injectable solution Uses up to 80 units per day on insulin pump (with dosing and waste)   • cholecalciferol (VITAMIN D3) 1000 UNITS tablet Take 5,000 Units by mouth daily.    • Lancets Thin Misc To test 7 times per day. Must have contour next to work with pump   • blood glucose test strip Test blood sugar 7 times daily.   • glucagon (GLUCAGON EMERGENCY) 1 MG injection kit Inject 1 mg into the muscle as needed (hypoglycemia).   • blood glucose test strip Test blood sugar 7 times daily as directed. Diagnosis: E10.65. Meter: Contour Next Link     No current facility-administered medications for this visit.        ALLERGIES:  No Known Allergies    REVIEW OF SYSTEMS:  Completed by MA, I reviewed it with the patient and agree with it's content. Complaints are chronic or as mentioned in HPI.     PHYSICAL EXAMINATION:  VITAL SIGNS:    Visit Vitals  /70 (BP Location: [facility], Patient Position: Sitting, Cuff Size: Regular)   Pulse 82   Ht 6' (1.829 m)   Wt 81.7 kg   BMI 24.43 kg/m²    Body mass index is 24.43 kg/m².  Constitutional: This is a(n) White male in no distress.  He ambulates normally without assistance. AAO times 3.  Fingerstick glucose is declined  Psychiatric:  Alert and oriented, normal mood and affect, normal eye contact, clear speech.  Neck: No masses. No cervical or supraclavicular lymphadenopathy. No thyromegaly, Trachea is midline. No JVD.  Lungs: Chest symmetrical, Non-labored and clear to auscultation bilaterally, No rales, No Rhonchi.  Cardiovascular: S1, S2 noted, regular rate and rhythm, no gallop or murmur. No edema.  Gastrointestinal: Normal bowel sounds, nontender, non-distended, no masses or guarding, no hepatosplenomegaly. Resolving ecchymosis consistent with injection history. These are benign and without evidence of lipohypertrophy.   Skin: Warm and dry, normal  texture, no rashes or ulcerations.   Musculoskeletal: Normal gait, no tremor, no clubbing or cyanosis.  Neurologic:  Reflexes symmetrical and WNL, Cranial nerves 3-12 intact.    Diabetic Foot Exam Documentation     Normal Bilateral Foot Exam: Skin integrity is normal. Dorsalis pedis and posterior tibial pulses are present.  Pressure sensation using the [place] monofilament is present.    Home glucose review: 08/14/19-08/27/19  Insulin pump review:  Total readings: 55  Average tests/day:  6.8  Average Glucose:  138  Range:    Average Carbohydrate intake: 185  % Basal:  40  % Bolus:  60  Total daily insulin use:  52 units plus waste for pump  Set change frequency: 2 days  Patterns:  Higher in am - No overcorrection from high to low or low to high.  Evening averages in range.  Patient does self adjust settings.  High following high fat, high carb meal. Variability with exercise.       Reviewed report with sensor. Fingerstick glucoses and sensor vary by up to 70 points.  He is using a ReliOn meter with his Medtronic sensor. Calibrating regularly when he was using the sensor. Frequent wide inconsistencies between meter and sensor curve over 5 days.       Laboratory Results:  TSH (mcUnits/mL)   Date Value   03/28/2019 1.876     Lab Results   Component Value Date    SODIUM 142 03/28/2019    POTASSIUM 5.0 03/28/2019    CHLORIDE 104 03/28/2019    CO2 26 03/28/2019    BUN 14 03/28/2019    CREATININE 0.85 03/28/2019    GLUCOSE 149 (H) 03/28/2019    URMIC 0.59 03/28/2019    UCR 91.50 03/28/2019    MALBCR 6.4 03/28/2019     Hemoglobin A1C   Date Value   03/28/2019 5.8 % (H)   11/28/2018 6.3     Hemoglobin A1C POC (%)   Date Value   08/27/2019 6.3 (A)   07/26/2017 6.7     TSH (mcUnits/mL)   Date Value   03/28/2019 1.876     CHOLESTEROL (mg/dL)   Date Value   03/28/2019 217 (H)     HDL (mg/dL)   Date Value   03/28/2019 46     CHOL/HDL (no units)   Date Value   03/28/2019 4.7 (H)     TRIGLYCERIDE (mg/dL)   Date Value    03/28/2019 54     CALCULATED LDL (mg/dL)   Date Value   03/28/2019 160 (H)     GFR Estimate, Non  (no units)   Date Value   03/28/2019 >90      VITAMIND, 25 HYDROXY (ng/mL)   Date Value   03/28/2019 66.9         Total time of visit was 45 minutes with greater than 50% spent in counseling and plan of care.    A copy of this note was sent to Ann E Severson, APNP.  ADOLFO Montgomery           detailed exam

## 2021-08-25 NOTE — ASU PATIENT PROFILE, ADULT - CAREGIVER
----- Message from Celio Evans MD sent at 8/25/2021 12:14 PM EDT -----  Coronary CTA:  1. Normal coronary CT angiogram  2.  Zero calcium score    Follow-up with EP and PCP Declines

## 2022-09-08 NOTE — PROGRESS NOTE ADULT - PROBLEM SELECTOR PROBLEM 1
Report from Reji Agosto, [address] [facility] [address] [place] [address]  09/08/22 3044 Malignant neoplasm of bladder

## 2023-06-27 NOTE — H&P PST ADULT - PROBLEM SELECTOR PLAN 3
Statement Selected Pt instructed to hold metformin 2/27 and 2/28.  OR booking notified of pt's diabetes status via fax.

## 2024-09-05 NOTE — PROGRESS NOTE ADULT - PROBLEM/PLAN-7
Please go to pharmacy and  prescriptions for Zofran and Flomax and use as directed.    Can take 1000 mg of Tylenol every 4-6 hours and/or 400 mg of ibuprofen every 6-8 hours as needed for pain/fever  Do not exceed 4000 mg of Tylenol in a 24-hour period.    Today we did blood work that showed some mild elevations in your creatinine which is a measure of your kidney function.  Your CT scan here today shows you have a 5 mm right sided kidney stone causing your pain.     I recommend close monitoring and pain control and Flomax at home to see if you can pass a stone on your own.  If you notice that the pain is getting progressively worse or you are having high fevers or persistently vomiting you will have to return here for reevaluation.  
DISPLAY PLAN FREE TEXT

## 2024-10-04 NOTE — H&P PST ADULT - OTHER CARE PROVIDERS
"Subjective:      Patient ID: Cb Swan is a 66 y.o. male.    Chief Complaint:  Type 2 diabetes mellitus    History of Present Illness  This is a 66 y.o. male. with a past medical history of Type 2 diabetes mellitus, BMI 31, CAD, HTN, HLD here for evaluation.    Type 2 diabetes mellitus  Diagnosed around age 55    Current diabetes medications:  - Metformin 1,000 mg BID  - Jardiance 10 mg daily  - Mounjaro 5 mg weekly    Past diabetes medications:  - Tradjenta    Lab Results   Component Value Date    CREATININE 1.4 03/08/2024    EGFRNORACEVR 55 (A) 03/08/2024       Known diabetic complications: cardiovascular disease    Weight trend:  Wt Readings from Last 8 Encounters:   03/08/24 80.3 kg (177 lb)   07/24/23 79.3 kg (174 lb 13.2 oz)   04/06/23 77.1 kg (170 lb)   03/23/23 77.5 kg (170 lb 13.7 oz)   03/14/23 83.9 kg (185 lb)   02/15/23 81 kg (178 lb 9.2 oz)       Family history of diabetes:  Yes      Blood glucose monitoring at home:   No recent CGM use    Outside labs  9/26/24  A1c 7.1%  Glucose 139  Cr 1.16, GFR > 60  LDL 61, HDL 39, Tg 111  TSH 1.44        Diabetes Management Status  Statin: Taking  ACE/ARB: Not taking    Screening or Prevention Patient's value   HgA1C Testing and Control   Lab Results   Component Value Date    HGBA1C 8.7 (H) 03/08/2024        LDL control Lab Results   Component Value Date    LDLCALC 34.2 (L) 03/08/2024      Nephropathy screening Lab Results   Component Value Date    MICALBCREAT 8.5 03/08/2024        Lab Results   Component Value Date    TSH 1.185 03/08/2024       Last eye exam: : 11/11/2014      Review of Systems  As above    Social and family history reviewed  Current medications and allergies reviewed    Objective:   /60   Pulse 68   Resp 17   Ht 5' 3" (1.6 m)   SpO2 98%   BMI 31.35 kg/m²   Physical Exam  Alert, oriented    BP Readings from Last 1 Encounters:   10/04/24 130/60      Wt Readings from Last 1 Encounters:   03/08/24 1022 80.3 kg (177 lb)     Body mass " index is 31.35 kg/m².    Lab Review:   Lab Results   Component Value Date    HGBA1C 8.7 (H) 03/08/2024     Lab Results   Component Value Date    CHOL 107 (L) 03/08/2024    HDL 30 (L) 03/08/2024    LDLCALC 34.2 (L) 03/08/2024    TRIG 214 (H) 03/08/2024    CHOLHDL 28.0 03/08/2024     Lab Results   Component Value Date     03/08/2024    K 4.5 03/08/2024     03/08/2024    CO2 24 03/08/2024     (H) 03/08/2024    BUN 21 03/08/2024    CREATININE 1.4 03/08/2024    CALCIUM 10.1 03/08/2024    PROT 7.5 03/08/2024    ALBUMIN 4.0 03/08/2024    BILITOT 0.5 03/08/2024    ALKPHOS 79 03/08/2024    AST 16 03/08/2024    ALT 14 03/08/2024    ANIONGAP 10 03/08/2024    TSH 1.185 03/08/2024       All pertinent labs reviewed    Assessment and Plan     Type 2 diabetes mellitus with hyperglycemia, without long-term current use of insulin  Control improved with use of GLP-1 RA. Recent A1c 7.1% down from 8.7%. Will restart CGM and based on review, consider increasing GLP-1/GIP RA    Continue metformin and SGLT-2 inhibitor      Plan  - Continue Mounjaro 5 mg weekly, increase if indicated based on CGM  - Continue Jardiance 10 mg daily  - Continue Metformin 1,000 mg BID  - Continue FreeStyle Deanna 3 - change to plus which has had more availability    Review CGM in 1 week    F/u 3 months      BMI 31.0-31.9,adult  Continue GLP-1/GIP RA given weight loss benefit    Mixed hyperlipidemia  Continue statin  Followed by Cardiology        Isaiah Gongora MD  Endocrinology       Cardiologist Dr. Graham  [phone]- last seen 2017
